# Patient Record
Sex: MALE | Race: WHITE | Employment: FULL TIME | URBAN - NONMETROPOLITAN AREA
[De-identification: names, ages, dates, MRNs, and addresses within clinical notes are randomized per-mention and may not be internally consistent; named-entity substitution may affect disease eponyms.]

---

## 2020-06-05 ENCOUNTER — APPOINTMENT (OUTPATIENT)
Dept: GENERAL RADIOLOGY | Age: 50
End: 2020-06-05
Payer: COMMERCIAL

## 2020-06-05 ENCOUNTER — ANESTHESIA (OUTPATIENT)
Dept: PERIOP | Facility: HOSPITAL | Age: 50
End: 2020-06-05

## 2020-06-05 ENCOUNTER — ANESTHESIA EVENT (OUTPATIENT)
Dept: PERIOP | Facility: HOSPITAL | Age: 50
End: 2020-06-05

## 2020-06-05 ENCOUNTER — APPOINTMENT (OUTPATIENT)
Dept: CT IMAGING | Age: 50
End: 2020-06-05
Payer: COMMERCIAL

## 2020-06-05 ENCOUNTER — HOSPITAL ENCOUNTER (EMERGENCY)
Age: 50
Discharge: OTHER INSTITUTION | End: 2020-06-05
Attending: EMERGENCY MEDICINE
Payer: COMMERCIAL

## 2020-06-05 ENCOUNTER — HOSPITAL ENCOUNTER (OUTPATIENT)
Facility: HOSPITAL | Age: 50
Discharge: HOME | End: 2020-06-07
Attending: INTERNAL MEDICINE | Admitting: OTOLARYNGOLOGY

## 2020-06-05 VITALS
TEMPERATURE: 98.9 F | WEIGHT: 185 LBS | HEIGHT: 67 IN | HEART RATE: 76 BPM | BODY MASS INDEX: 29.03 KG/M2 | SYSTOLIC BLOOD PRESSURE: 121 MMHG | OXYGEN SATURATION: 99 % | RESPIRATION RATE: 20 BRPM | DIASTOLIC BLOOD PRESSURE: 76 MMHG

## 2020-06-05 DIAGNOSIS — L03.211 CELLULITIS OF FACE: ICD-10-CM

## 2020-06-05 DIAGNOSIS — E11.65 UNCONTROLLED TYPE 2 DIABETES MELLITUS WITH HYPERGLYCEMIA (HCC): ICD-10-CM

## 2020-06-05 DIAGNOSIS — L02.01 FACIAL ABSCESS: Primary | ICD-10-CM

## 2020-06-05 PROBLEM — E87.1 HYPONATREMIA: Status: ACTIVE | Noted: 2020-06-05

## 2020-06-05 PROBLEM — R51.9 FACIAL PAIN, ACUTE: Status: ACTIVE | Noted: 2020-06-05

## 2020-06-05 LAB
ACETONE BLOOD: NEGATIVE
ALBUMIN SERPL-MCNC: 3.9 G/DL (ref 3.5–5.2)
ALP BLD-CCNC: 105 U/L (ref 40–130)
ALT SERPL-CCNC: 16 U/L (ref 5–41)
ANION GAP SERPL CALCULATED.3IONS-SCNC: 13 MMOL/L (ref 7–19)
AST SERPL-CCNC: 14 U/L (ref 5–40)
BASOPHILS ABSOLUTE: 0.1 K/UL (ref 0–0.2)
BASOPHILS RELATIVE PERCENT: 0.4 % (ref 0–1)
BILIRUB SERPL-MCNC: 0.7 MG/DL (ref 0.2–1.2)
BILIRUBIN URINE: NEGATIVE
BLOOD, URINE: NEGATIVE
BUN BLDV-MCNC: 18 MG/DL (ref 6–20)
C-REACTIVE PROTEIN: 19.23 MG/DL (ref 0–0.5)
CALCIUM SERPL-MCNC: 9 MG/DL (ref 8.6–10)
CHLORIDE BLD-SCNC: 96 MMOL/L (ref 98–111)
CLARITY: CLEAR
CO2: 24 MMOL/L (ref 22–29)
COLOR: YELLOW
CREAT SERPL-MCNC: 0.7 MG/DL (ref 0.5–1.2)
EOSINOPHILS ABSOLUTE: 0.1 K/UL (ref 0–0.6)
EOSINOPHILS RELATIVE PERCENT: 0.4 % (ref 0–5)
GFR NON-AFRICAN AMERICAN: >60
GLUCOSE BLD-MCNC: 269 MG/DL (ref 74–109)
GLUCOSE URINE: >=1000 MG/DL
HBA1C MFR BLD: 14.4 % (ref 4–6)
HCT VFR BLD CALC: 44.7 % (ref 42–52)
HEMOGLOBIN: 16.1 G/DL (ref 14–18)
IMMATURE GRANULOCYTES #: 0.1 K/UL
KETONES, URINE: 80 MG/DL
LACTIC ACID: 1.1 MMOL/L (ref 0.5–1.9)
LEUKOCYTE ESTERASE, URINE: NEGATIVE
LYMPHOCYTES ABSOLUTE: 1.8 K/UL (ref 1.1–4.5)
LYMPHOCYTES RELATIVE PERCENT: 14.7 % (ref 20–40)
MCH RBC QN AUTO: 29.9 PG (ref 27–31)
MCHC RBC AUTO-ENTMCNC: 36 G/DL (ref 33–37)
MCV RBC AUTO: 83.1 FL (ref 80–94)
MONOCYTES ABSOLUTE: 0.9 K/UL (ref 0–0.9)
MONOCYTES RELATIVE PERCENT: 7.4 % (ref 0–10)
MRSA DNA SPEC QL NAA+PROBE: NORMAL
NEUTROPHILS ABSOLUTE: 9.6 K/UL (ref 1.5–7.5)
NEUTROPHILS RELATIVE PERCENT: 76.6 % (ref 50–65)
NITRITE, URINE: NEGATIVE
PDW BLD-RTO: 11.9 % (ref 11.5–14.5)
PH UA: 5.5 (ref 5–8)
PLATELET # BLD: 200 K/UL (ref 130–400)
PMV BLD AUTO: 11.9 FL (ref 9.4–12.4)
POTASSIUM REFLEX MAGNESIUM: 4.2 MMOL/L (ref 3.5–5)
PROTEIN UA: ABNORMAL MG/DL
RBC # BLD: 5.38 M/UL (ref 4.7–6.1)
SARS-COV-2 RNA PNL SPEC NAA+PROBE: NOT DETECTED
SODIUM BLD-SCNC: 133 MMOL/L (ref 136–145)
SPECIFIC GRAVITY UA: >1.045 (ref 1–1.03)
TOTAL PROTEIN: 7.3 G/DL (ref 6.6–8.7)
UROBILINOGEN, URINE: 0.2 E.U./DL
WBC # BLD: 12.5 K/UL (ref 4.8–10.8)

## 2020-06-05 PROCEDURE — 85025 COMPLETE CBC W/AUTO DIFF WBC: CPT

## 2020-06-05 PROCEDURE — 40801 DRAINAGE OF MOUTH LESION: CPT | Performed by: OTOLARYNGOLOGY

## 2020-06-05 PROCEDURE — 25010000002 ONDANSETRON PER 1 MG: Performed by: NURSE ANESTHETIST, CERTIFIED REGISTERED

## 2020-06-05 PROCEDURE — 87070 CULTURE OTHR SPECIMN AEROBIC: CPT | Performed by: OTOLARYNGOLOGY

## 2020-06-05 PROCEDURE — 25010000002 KETOROLAC TROMETHAMINE PER 15 MG: Performed by: NURSE PRACTITIONER

## 2020-06-05 PROCEDURE — 81003 URINALYSIS AUTO W/O SCOPE: CPT

## 2020-06-05 PROCEDURE — 71045 X-RAY EXAM CHEST 1 VIEW: CPT

## 2020-06-05 PROCEDURE — 94799 UNLISTED PULMONARY SVC/PX: CPT

## 2020-06-05 PROCEDURE — 87186 SC STD MICRODIL/AGAR DIL: CPT | Performed by: OTOLARYNGOLOGY

## 2020-06-05 PROCEDURE — 96366 THER/PROPH/DIAG IV INF ADDON: CPT

## 2020-06-05 PROCEDURE — 25010000002 PROPOFOL 10 MG/ML EMULSION: Performed by: NURSE ANESTHETIST, CERTIFIED REGISTERED

## 2020-06-05 PROCEDURE — 96361 HYDRATE IV INFUSION ADD-ON: CPT

## 2020-06-05 PROCEDURE — 83605 ASSAY OF LACTIC ACID: CPT

## 2020-06-05 PROCEDURE — 96365 THER/PROPH/DIAG IV INF INIT: CPT

## 2020-06-05 PROCEDURE — 87635 SARS-COV-2 COVID-19 AMP PRB: CPT | Performed by: INTERNAL MEDICINE

## 2020-06-05 PROCEDURE — 25010000002 SUCCINYLCHOLINE PER 20 MG: Performed by: NURSE ANESTHETIST, CERTIFIED REGISTERED

## 2020-06-05 PROCEDURE — 6370000000 HC RX 637 (ALT 250 FOR IP): Performed by: EMERGENCY MEDICINE

## 2020-06-05 PROCEDURE — 87147 CULTURE TYPE IMMUNOLOGIC: CPT | Performed by: OTOLARYNGOLOGY

## 2020-06-05 PROCEDURE — 87641 MR-STAPH DNA AMP PROBE: CPT | Performed by: NURSE PRACTITIONER

## 2020-06-05 PROCEDURE — 82009 KETONE BODYS QUAL: CPT

## 2020-06-05 PROCEDURE — G0378 HOSPITAL OBSERVATION PER HR: HCPCS

## 2020-06-05 PROCEDURE — 96374 THER/PROPH/DIAG INJ IV PUSH: CPT

## 2020-06-05 PROCEDURE — 25010000002 HYDROMORPHONE PER 4 MG: Performed by: NURSE PRACTITIONER

## 2020-06-05 PROCEDURE — 99284 EMERGENCY DEPT VISIT MOD MDM: CPT

## 2020-06-05 PROCEDURE — 99204 OFFICE O/P NEW MOD 45 MIN: CPT | Performed by: OTOLARYNGOLOGY

## 2020-06-05 PROCEDURE — 96375 TX/PRO/DX INJ NEW DRUG ADDON: CPT

## 2020-06-05 PROCEDURE — 87075 CULTR BACTERIA EXCEPT BLOOD: CPT | Performed by: OTOLARYNGOLOGY

## 2020-06-05 PROCEDURE — 96376 TX/PRO/DX INJ SAME DRUG ADON: CPT

## 2020-06-05 PROCEDURE — G0379 DIRECT REFER HOSPITAL OBSERV: HCPCS

## 2020-06-05 PROCEDURE — 70491 CT SOFT TISSUE NECK W/DYE: CPT

## 2020-06-05 PROCEDURE — 36415 COLL VENOUS BLD VENIPUNCTURE: CPT

## 2020-06-05 PROCEDURE — 96367 TX/PROPH/DG ADDL SEQ IV INF: CPT

## 2020-06-05 PROCEDURE — 87040 BLOOD CULTURE FOR BACTERIA: CPT

## 2020-06-05 PROCEDURE — 2500000003 HC RX 250 WO HCPCS: Performed by: EMERGENCY MEDICINE

## 2020-06-05 PROCEDURE — 6360000002 HC RX W HCPCS

## 2020-06-05 PROCEDURE — 25010000002 KETOROLAC TROMETHAMINE PER 15 MG: Performed by: OTOLARYNGOLOGY

## 2020-06-05 PROCEDURE — 6360000004 HC RX CONTRAST MEDICATION: Performed by: EMERGENCY MEDICINE

## 2020-06-05 PROCEDURE — 86140 C-REACTIVE PROTEIN: CPT

## 2020-06-05 PROCEDURE — C9803 HOPD COVID-19 SPEC COLLECT: HCPCS

## 2020-06-05 PROCEDURE — 6360000002 HC RX W HCPCS: Performed by: EMERGENCY MEDICINE

## 2020-06-05 PROCEDURE — 83036 HEMOGLOBIN GLYCOSYLATED A1C: CPT

## 2020-06-05 PROCEDURE — 87205 SMEAR GRAM STAIN: CPT | Performed by: OTOLARYNGOLOGY

## 2020-06-05 PROCEDURE — 80053 COMPREHEN METABOLIC PANEL: CPT

## 2020-06-05 PROCEDURE — 2580000003 HC RX 258: Performed by: EMERGENCY MEDICINE

## 2020-06-05 RX ORDER — NALOXONE HCL 0.4 MG/ML
0.04 VIAL (ML) INJECTION AS NEEDED
Status: DISCONTINUED | OUTPATIENT
Start: 2020-06-05 | End: 2020-06-05 | Stop reason: HOSPADM

## 2020-06-05 RX ORDER — ACETAMINOPHEN 160 MG/5ML
650 SOLUTION ORAL EVERY 4 HOURS PRN
Status: DISCONTINUED | OUTPATIENT
Start: 2020-06-05 | End: 2020-06-07 | Stop reason: HOSPADM

## 2020-06-05 RX ORDER — SODIUM CHLORIDE, SODIUM LACTATE, POTASSIUM CHLORIDE, CALCIUM CHLORIDE 600; 310; 30; 20 MG/100ML; MG/100ML; MG/100ML; MG/100ML
100 INJECTION, SOLUTION INTRAVENOUS CONTINUOUS
Status: CANCELLED | OUTPATIENT
Start: 2020-06-05

## 2020-06-05 RX ORDER — LIDOCAINE HYDROCHLORIDE 10 MG/ML
0.5 INJECTION, SOLUTION EPIDURAL; INFILTRATION; INTRACAUDAL; PERINEURAL ONCE AS NEEDED
Status: CANCELLED | OUTPATIENT
Start: 2020-06-05

## 2020-06-05 RX ORDER — OXYCODONE AND ACETAMINOPHEN 10; 325 MG/1; MG/1
1 TABLET ORAL ONCE AS NEEDED
Status: DISCONTINUED | OUTPATIENT
Start: 2020-06-05 | End: 2020-06-05 | Stop reason: HOSPADM

## 2020-06-05 RX ORDER — LIDOCAINE HYDROCHLORIDE AND EPINEPHRINE 10; 10 MG/ML; UG/ML
INJECTION, SOLUTION INFILTRATION; PERINEURAL AS NEEDED
Status: DISCONTINUED | OUTPATIENT
Start: 2020-06-05 | End: 2020-06-05 | Stop reason: HOSPADM

## 2020-06-05 RX ORDER — ACETAMINOPHEN 650 MG/1
650 SUPPOSITORY RECTAL EVERY 4 HOURS PRN
Status: DISCONTINUED | OUTPATIENT
Start: 2020-06-05 | End: 2020-06-07 | Stop reason: HOSPADM

## 2020-06-05 RX ORDER — LIDOCAINE HYDROCHLORIDE 20 MG/ML
INJECTION, SOLUTION INFILTRATION; PERINEURAL AS NEEDED
Status: DISCONTINUED | OUTPATIENT
Start: 2020-06-05 | End: 2020-06-05 | Stop reason: SURG

## 2020-06-05 RX ORDER — OXYCODONE AND ACETAMINOPHEN 7.5; 325 MG/1; MG/1
2 TABLET ORAL ONCE AS NEEDED
Status: COMPLETED | OUTPATIENT
Start: 2020-06-05 | End: 2020-06-05

## 2020-06-05 RX ORDER — MIDAZOLAM HYDROCHLORIDE 1 MG/ML
1 INJECTION INTRAMUSCULAR; INTRAVENOUS
Status: CANCELLED | OUTPATIENT
Start: 2020-06-05

## 2020-06-05 RX ORDER — FENTANYL CITRATE 50 UG/ML
25 INJECTION, SOLUTION INTRAMUSCULAR; INTRAVENOUS
Status: DISCONTINUED | OUTPATIENT
Start: 2020-06-05 | End: 2020-06-05 | Stop reason: HOSPADM

## 2020-06-05 RX ORDER — HYDROMORPHONE HYDROCHLORIDE 1 MG/ML
0.5 INJECTION, SOLUTION INTRAMUSCULAR; INTRAVENOUS; SUBCUTANEOUS EVERY 30 MIN PRN
Status: COMPLETED | OUTPATIENT
Start: 2020-06-05 | End: 2020-06-05

## 2020-06-05 RX ORDER — CLINDAMYCIN PHOSPHATE 600 MG/50ML
600 INJECTION INTRAVENOUS EVERY 8 HOURS
Status: DISCONTINUED | OUTPATIENT
Start: 2020-06-05 | End: 2020-06-05

## 2020-06-05 RX ORDER — DEXTROSE MONOHYDRATE 25 G/50ML
25 INJECTION, SOLUTION INTRAVENOUS
Status: DISCONTINUED | OUTPATIENT
Start: 2020-06-05 | End: 2020-06-07 | Stop reason: HOSPADM

## 2020-06-05 RX ORDER — ONDANSETRON 2 MG/ML
4 INJECTION INTRAMUSCULAR; INTRAVENOUS ONCE
Status: COMPLETED | OUTPATIENT
Start: 2020-06-05 | End: 2020-06-05

## 2020-06-05 RX ORDER — SODIUM CHLORIDE 0.9 % (FLUSH) 0.9 %
3 SYRINGE (ML) INJECTION EVERY 12 HOURS SCHEDULED
Status: CANCELLED | OUTPATIENT
Start: 2020-06-05

## 2020-06-05 RX ORDER — SODIUM CHLORIDE 9 MG/ML
50 INJECTION, SOLUTION INTRAVENOUS CONTINUOUS
Status: DISCONTINUED | OUTPATIENT
Start: 2020-06-05 | End: 2020-06-06

## 2020-06-05 RX ORDER — LABETALOL HYDROCHLORIDE 5 MG/ML
5 INJECTION, SOLUTION INTRAVENOUS
Status: DISCONTINUED | OUTPATIENT
Start: 2020-06-05 | End: 2020-06-05 | Stop reason: HOSPADM

## 2020-06-05 RX ORDER — SUCCINYLCHOLINE CHLORIDE 20 MG/ML
INJECTION INTRAMUSCULAR; INTRAVENOUS AS NEEDED
Status: DISCONTINUED | OUTPATIENT
Start: 2020-06-05 | End: 2020-06-05 | Stop reason: SURG

## 2020-06-05 RX ORDER — 0.9 % SODIUM CHLORIDE 0.9 %
1000 INTRAVENOUS SOLUTION INTRAVENOUS ONCE
Status: COMPLETED | OUTPATIENT
Start: 2020-06-05 | End: 2020-06-05

## 2020-06-05 RX ORDER — MIDAZOLAM HYDROCHLORIDE 1 MG/ML
2 INJECTION INTRAMUSCULAR; INTRAVENOUS
Status: CANCELLED | OUTPATIENT
Start: 2020-06-05

## 2020-06-05 RX ORDER — NICOTINE POLACRILEX 4 MG
15 LOZENGE BUCCAL
Status: DISCONTINUED | OUTPATIENT
Start: 2020-06-05 | End: 2020-06-07 | Stop reason: HOSPADM

## 2020-06-05 RX ORDER — ACETAMINOPHEN 500 MG
1000 TABLET ORAL ONCE
Status: CANCELLED | OUTPATIENT
Start: 2020-06-05 | End: 2020-06-05

## 2020-06-05 RX ORDER — ACETAMINOPHEN 325 MG/1
650 TABLET ORAL EVERY 4 HOURS PRN
Status: DISCONTINUED | OUTPATIENT
Start: 2020-06-05 | End: 2020-06-07 | Stop reason: HOSPADM

## 2020-06-05 RX ORDER — SODIUM CHLORIDE 0.9 % (FLUSH) 0.9 %
10 SYRINGE (ML) INJECTION EVERY 12 HOURS SCHEDULED
Status: DISCONTINUED | OUTPATIENT
Start: 2020-06-05 | End: 2020-06-07 | Stop reason: HOSPADM

## 2020-06-05 RX ORDER — IBUPROFEN 200 MG
200 TABLET ORAL EVERY 6 HOURS PRN
COMMUNITY

## 2020-06-05 RX ORDER — CLINDAMYCIN PHOSPHATE 600 MG/50ML
600 INJECTION INTRAVENOUS ONCE
Status: COMPLETED | OUTPATIENT
Start: 2020-06-05 | End: 2020-06-05

## 2020-06-05 RX ORDER — SODIUM CHLORIDE 0.9 % (FLUSH) 0.9 %
3-10 SYRINGE (ML) INJECTION AS NEEDED
Status: CANCELLED | OUTPATIENT
Start: 2020-06-05

## 2020-06-05 RX ORDER — PROPOFOL 10 MG/ML
VIAL (ML) INTRAVENOUS AS NEEDED
Status: DISCONTINUED | OUTPATIENT
Start: 2020-06-05 | End: 2020-06-05 | Stop reason: SURG

## 2020-06-05 RX ORDER — ONDANSETRON 2 MG/ML
4 INJECTION INTRAMUSCULAR; INTRAVENOUS AS NEEDED
Status: DISCONTINUED | OUTPATIENT
Start: 2020-06-05 | End: 2020-06-05 | Stop reason: HOSPADM

## 2020-06-05 RX ORDER — HYDROMORPHONE HYDROCHLORIDE 1 MG/ML
0.5 INJECTION, SOLUTION INTRAMUSCULAR; INTRAVENOUS; SUBCUTANEOUS
Status: DISCONTINUED | OUTPATIENT
Start: 2020-06-05 | End: 2020-06-07 | Stop reason: HOSPADM

## 2020-06-05 RX ORDER — KETOROLAC TROMETHAMINE 30 MG/ML
30 INJECTION, SOLUTION INTRAMUSCULAR; INTRAVENOUS EVERY 6 HOURS PRN
Status: DISCONTINUED | OUTPATIENT
Start: 2020-06-05 | End: 2020-06-07 | Stop reason: HOSPADM

## 2020-06-05 RX ORDER — ONDANSETRON 2 MG/ML
INJECTION INTRAMUSCULAR; INTRAVENOUS AS NEEDED
Status: DISCONTINUED | OUTPATIENT
Start: 2020-06-05 | End: 2020-06-05 | Stop reason: SURG

## 2020-06-05 RX ORDER — SODIUM CHLORIDE 0.9 % (FLUSH) 0.9 %
10 SYRINGE (ML) INJECTION AS NEEDED
Status: DISCONTINUED | OUTPATIENT
Start: 2020-06-05 | End: 2020-06-07 | Stop reason: HOSPADM

## 2020-06-05 RX ORDER — HYDROMORPHONE HYDROCHLORIDE 1 MG/ML
0.5 INJECTION, SOLUTION INTRAMUSCULAR; INTRAVENOUS; SUBCUTANEOUS ONCE
Status: COMPLETED | OUTPATIENT
Start: 2020-06-05 | End: 2020-06-05

## 2020-06-05 RX ORDER — FLUMAZENIL 0.1 MG/ML
0.2 INJECTION INTRAVENOUS AS NEEDED
Status: DISCONTINUED | OUTPATIENT
Start: 2020-06-05 | End: 2020-06-05 | Stop reason: HOSPADM

## 2020-06-05 RX ORDER — MORPHINE SULFATE 2 MG/ML
2 INJECTION, SOLUTION INTRAMUSCULAR; INTRAVENOUS
Status: DISCONTINUED | OUTPATIENT
Start: 2020-06-05 | End: 2020-06-05 | Stop reason: HOSPADM

## 2020-06-05 RX ADMIN — PROPOFOL 50 MG: 10 INJECTION, EMULSION INTRAVENOUS at 22:00

## 2020-06-05 RX ADMIN — KETOROLAC TROMETHAMINE 30 MG: 30 INJECTION, SOLUTION INTRAMUSCULAR; INTRAVENOUS at 22:02

## 2020-06-05 RX ADMIN — SUCCINYLCHOLINE CHLORIDE 140 MG: 20 INJECTION, SOLUTION INTRAMUSCULAR; INTRAVENOUS at 21:52

## 2020-06-05 RX ADMIN — OXYCODONE HYDROCHLORIDE AND ACETAMINOPHEN 2 TABLET: 7.5; 325 TABLET ORAL at 22:39

## 2020-06-05 RX ADMIN — ONDANSETRON 4 MG: 2 INJECTION INTRAMUSCULAR; INTRAVENOUS at 12:39

## 2020-06-05 RX ADMIN — HYDROMORPHONE HYDROCHLORIDE 0.5 MG: 1 INJECTION, SOLUTION INTRAMUSCULAR; INTRAVENOUS; SUBCUTANEOUS at 17:08

## 2020-06-05 RX ADMIN — ONDANSETRON HYDROCHLORIDE 4 MG: 2 SOLUTION INTRAMUSCULAR; INTRAVENOUS at 22:39

## 2020-06-05 RX ADMIN — HYDROMORPHONE HYDROCHLORIDE 0.5 MG: 1 INJECTION, SOLUTION INTRAMUSCULAR; INTRAVENOUS; SUBCUTANEOUS at 12:39

## 2020-06-05 RX ADMIN — INSULIN LISPRO 6 UNITS: 100 INJECTION, SOLUTION INTRAVENOUS; SUBCUTANEOUS at 17:07

## 2020-06-05 RX ADMIN — PROPOFOL 50 MG: 10 INJECTION, EMULSION INTRAVENOUS at 22:04

## 2020-06-05 RX ADMIN — IOPAMIDOL 90 ML: 755 INJECTION, SOLUTION INTRAVENOUS at 13:35

## 2020-06-05 RX ADMIN — Medication 1000 MG: at 13:20

## 2020-06-05 RX ADMIN — CLINDAMYCIN PHOSPHATE 600 MG: 600 INJECTION, SOLUTION INTRAVENOUS at 12:39

## 2020-06-05 RX ADMIN — HYDROMORPHONE HYDROCHLORIDE 0.5 MG: 1 INJECTION, SOLUTION INTRAMUSCULAR; INTRAVENOUS; SUBCUTANEOUS at 14:26

## 2020-06-05 RX ADMIN — SODIUM CHLORIDE 100 ML/HR: 9 INJECTION, SOLUTION INTRAVENOUS at 19:51

## 2020-06-05 RX ADMIN — LIDOCAINE HYDROCHLORIDE 100 MG: 20 INJECTION, SOLUTION INFILTRATION; PERINEURAL at 21:52

## 2020-06-05 RX ADMIN — SODIUM CHLORIDE 1000 ML: 9 INJECTION, SOLUTION INTRAVENOUS at 12:39

## 2020-06-05 RX ADMIN — HYDROMORPHONE HYDROCHLORIDE 0.5 MG: 1 INJECTION, SOLUTION INTRAMUSCULAR; INTRAVENOUS; SUBCUTANEOUS at 19:51

## 2020-06-05 RX ADMIN — ONDANSETRON HYDROCHLORIDE 4 MG: 2 SOLUTION INTRAMUSCULAR; INTRAVENOUS at 22:02

## 2020-06-05 RX ADMIN — PROPOFOL 200 MG: 10 INJECTION, EMULSION INTRAVENOUS at 21:52

## 2020-06-05 ASSESSMENT — ENCOUNTER SYMPTOMS
SHORTNESS OF BREATH: 0
EYE DISCHARGE: 0
NAUSEA: 0
SORE THROAT: 0
BLOOD IN STOOL: 0
SINUS PRESSURE: 0
TROUBLE SWALLOWING: 0
CONSTIPATION: 0
FACIAL SWELLING: 1
APNEA: 0
CHOKING: 0
VOICE CHANGE: 0
DIARRHEA: 0

## 2020-06-05 ASSESSMENT — PAIN SCALES - GENERAL
PAINLEVEL_OUTOF10: 6
PAINLEVEL_OUTOF10: 10
PAINLEVEL_OUTOF10: 5

## 2020-06-05 NOTE — ED NOTES
ASSESSMENT:  Pt presents with swelling to upper lip and nose area. SKIN:  Warm, dry, pink. Cap refill < 2 sec  CARDIAC:  S1 S2 noted  LUNGS: clear upper and lower lobes. Respirations even and unlabored. ABDOMEN: bowel sounds noted upper and lower quadrants. Soft and tender. EXTREMITIES: bilateral DP and PT. No edema noted. Pt alert and oriented x4. Pupils equal and reactive. No distress noted. Side rails up and call light within reach.        Dariela Flores RN  06/05/20 0685

## 2020-06-05 NOTE — ED PROVIDER NOTES
the emergency physician with the below findings:    I have reviewed the results. Interpretation per the Radiologist below, if available at the time of this note:    CT Soft Tissue Neck W Contrast   Final Result   1. Upper lip cellulitis with midline (and slightly left of midline)   multilocular abscess. Signed by Dr Marisela Rubio on 6/5/2020 2:05 PM      XR CHEST PORTABLE   Final Result   Portable chest x-ray demonstrates no active disease. Signed by Dr Shane Yanes on 6/5/2020 11:55 AM            ED BEDSIDE ULTRASOUND:   Performed by ED Physician - none    LABS:  Labs Reviewed   CBC WITH AUTO DIFFERENTIAL - Abnormal; Notable for the following components:       Result Value    WBC 12.5 (*)     Neutrophils % 76.6 (*)     Lymphocytes % 14.7 (*)     Neutrophils Absolute 9.6 (*)     All other components within normal limits   COMPREHENSIVE METABOLIC PANEL W/ REFLEX TO MG FOR LOW K - Abnormal; Notable for the following components:    Sodium 133 (*)     Chloride 96 (*)     Glucose 269 (*)     All other components within normal limits   C-REACTIVE PROTEIN - Abnormal; Notable for the following components:    CRP 19.23 (*)     All other components within normal limits   URINE RT REFLEX TO CULTURE - Abnormal; Notable for the following components:    Glucose, Ur >=1000 (*)     Ketones, Urine 80 (*)     Protein, UA TRACE (*)     All other components within normal limits   HEMOGLOBIN A1C - Abnormal; Notable for the following components:    Hemoglobin A1C 14.4 (*)     All other components within normal limits   CULTURE, BLOOD 1   CULTURE, BLOOD 2   LACTIC ACID, PLASMA   ACETONE       All other labs were within normal range or not returned as of this dictation.     EMERGENCY DEPARTMENT COURSE and DIFFERENTIALDIAGNOSIS/MDM:   Vitals:    Vitals:    06/05/20 0955 06/05/20 1436 06/05/20 1554 06/05/20 1702   BP: (!) 146/93 130/76 128/76 120/76   Pulse: 93 75 74 75   Resp: 16 20 20 20   Temp: 98.3 °F (36.8 °C) TempSrc: Oral      SpO2: 96% 90% 97% 97%   Weight: 185 lb (83.9 kg)      Height: 5' 7\" (1.702 m)          MDM  Number of Diagnoses or Management Options  Diagnosis management comments: Patient has abscess that is drainable. But I do not have ENT or oral surgery available. I discussed the case with Rochelle Matute of ENT at [facility]. And with Dr. Zuri Rosado the hospitalist at [facility]. Patient be transferred to [facility] for definitive care. Patient remained stable in the ED. CONSULTS:  None    PROCEDURES:  Unless otherwise notedbelow, none     Procedures    FINAL IMPRESSION     1. Facial cellulitis    2. Abscess, lip    3.  Controlled type 2 diabetes mellitus with complication, without long-term current use of insulin [facility]          DISPOSITION/PLAN   DISPOSITION Decision To Transfer 06/05/2020 05:06:07 PM      PATIENT REFERRED TO:  @FUP@    DISCHARGE MEDICATIONS:  New Prescriptions    No medications on file          (Please note that portions of this note were completed with a voice recognition program.  Efforts were made to edit the dictations butoccasionally words are mis-transcribed.)    Sandra Kingsley MD (electronically signed)  AttendingEmergency Physician          Landon Cummings MD  06/05/20 0709
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
## 2020-06-06 LAB
BASOPHILS # BLD AUTO: 0.03 10*3/MM3 (ref 0–0.2)
BASOPHILS NFR BLD AUTO: 0.2 % (ref 0–1.5)
CHOLEST SERPL-MCNC: 155 MG/DL (ref 0–200)
DEPRECATED RDW RBC AUTO: 35.5 FL (ref 37–54)
EOSINOPHIL # BLD AUTO: 0.03 10*3/MM3 (ref 0–0.4)
EOSINOPHIL NFR BLD AUTO: 0.2 % (ref 0.3–6.2)
ERYTHROCYTE [DISTWIDTH] IN BLOOD BY AUTOMATED COUNT: 11.8 % (ref 12.3–15.4)
GLUCOSE BLDC GLUCOMTR-MCNC: 205 MG/DL (ref 70–130)
GLUCOSE BLDC GLUCOMTR-MCNC: 218 MG/DL (ref 70–130)
GLUCOSE BLDC GLUCOMTR-MCNC: 304 MG/DL (ref 70–130)
HCT VFR BLD AUTO: 38.6 % (ref 37.5–51)
HDLC SERPL-MCNC: 47 MG/DL (ref 40–60)
HGB BLD-MCNC: 13.5 G/DL (ref 13–17.7)
IMM GRANULOCYTES # BLD AUTO: 0.06 10*3/MM3 (ref 0–0.05)
IMM GRANULOCYTES NFR BLD AUTO: 0.5 % (ref 0–0.5)
LDLC SERPL CALC-MCNC: 86 MG/DL (ref 0–100)
LDLC/HDLC SERPL: 1.83 {RATIO}
LYMPHOCYTES # BLD AUTO: 1.89 10*3/MM3 (ref 0.7–3.1)
LYMPHOCYTES NFR BLD AUTO: 15 % (ref 19.6–45.3)
MCH RBC QN AUTO: 29.2 PG (ref 26.6–33)
MCHC RBC AUTO-ENTMCNC: 35 G/DL (ref 31.5–35.7)
MCV RBC AUTO: 83.4 FL (ref 79–97)
MONOCYTES # BLD AUTO: 1.11 10*3/MM3 (ref 0.1–0.9)
MONOCYTES NFR BLD AUTO: 8.8 % (ref 5–12)
NEUTROPHILS # BLD AUTO: 9.47 10*3/MM3 (ref 1.7–7)
NEUTROPHILS NFR BLD AUTO: 75.3 % (ref 42.7–76)
NRBC BLD AUTO-RTO: 0 /100 WBC (ref 0–0.2)
PLATELET # BLD AUTO: 197 10*3/MM3 (ref 140–450)
PMV BLD AUTO: 12.2 FL (ref 6–12)
RBC # BLD AUTO: 4.63 10*6/MM3 (ref 4.14–5.8)
TRIGL SERPL-MCNC: 110 MG/DL (ref 0–150)
VLDLC SERPL-MCNC: 22 MG/DL
WBC NRBC COR # BLD: 12.59 10*3/MM3 (ref 3.4–10.8)

## 2020-06-06 PROCEDURE — 63710000001 INSULIN LISPRO (HUMAN) PER 5 UNITS: Performed by: OTOLARYNGOLOGY

## 2020-06-06 PROCEDURE — 99024 POSTOP FOLLOW-UP VISIT: CPT | Performed by: OTOLARYNGOLOGY

## 2020-06-06 PROCEDURE — G0378 HOSPITAL OBSERVATION PER HR: HCPCS

## 2020-06-06 PROCEDURE — 25010000002 VANCOMYCIN 10 G RECONSTITUTED SOLUTION: Performed by: OTOLARYNGOLOGY

## 2020-06-06 PROCEDURE — 25010000002 HYDROMORPHONE PER 4 MG: Performed by: OTOLARYNGOLOGY

## 2020-06-06 PROCEDURE — 82962 GLUCOSE BLOOD TEST: CPT

## 2020-06-06 PROCEDURE — 25010000002 PIPERACILLIN SOD-TAZOBACTAM PER 1 G: Performed by: OTOLARYNGOLOGY

## 2020-06-06 PROCEDURE — 80061 LIPID PANEL: CPT | Performed by: OTOLARYNGOLOGY

## 2020-06-06 PROCEDURE — 85025 COMPLETE CBC W/AUTO DIFF WBC: CPT | Performed by: OTOLARYNGOLOGY

## 2020-06-06 PROCEDURE — 25010000002 KETOROLAC TROMETHAMINE PER 15 MG: Performed by: OTOLARYNGOLOGY

## 2020-06-06 RX ORDER — ECHINACEA PURPUREA EXTRACT 125 MG
2 TABLET ORAL CONTINUOUS PRN
Status: DISCONTINUED | OUTPATIENT
Start: 2020-06-06 | End: 2020-06-07 | Stop reason: HOSPADM

## 2020-06-06 RX ORDER — OXYMETAZOLINE HYDROCHLORIDE 0.05 G/100ML
2 SPRAY NASAL 3 TIMES DAILY
Status: DISCONTINUED | OUTPATIENT
Start: 2020-06-06 | End: 2020-06-07 | Stop reason: HOSPADM

## 2020-06-06 RX ORDER — ECHINACEA PURPUREA EXTRACT 125 MG
2 TABLET ORAL
Status: DISCONTINUED | OUTPATIENT
Start: 2020-06-06 | End: 2020-06-07 | Stop reason: HOSPADM

## 2020-06-06 RX ADMIN — SODIUM CHLORIDE, PRESERVATIVE FREE 10 ML: 5 INJECTION INTRAVENOUS at 08:03

## 2020-06-06 RX ADMIN — SALINE NASAL SPRAY 2 SPRAY: 1.5 SOLUTION NASAL at 18:01

## 2020-06-06 RX ADMIN — HYDROMORPHONE HYDROCHLORIDE 0.5 MG: 1 INJECTION, SOLUTION INTRAMUSCULAR; INTRAVENOUS; SUBCUTANEOUS at 03:53

## 2020-06-06 RX ADMIN — Medication 2 SPRAY: at 21:54

## 2020-06-06 RX ADMIN — INSULIN LISPRO 5 UNITS: 100 INJECTION, SOLUTION INTRAVENOUS; SUBCUTANEOUS at 18:00

## 2020-06-06 RX ADMIN — DOXYCYCLINE 100 MG: 100 INJECTION, POWDER, LYOPHILIZED, FOR SOLUTION INTRAVENOUS at 11:53

## 2020-06-06 RX ADMIN — KETOROLAC TROMETHAMINE 30 MG: 30 INJECTION, SOLUTION INTRAMUSCULAR; INTRAVENOUS at 08:04

## 2020-06-06 RX ADMIN — SALINE NASAL SPRAY 2 SPRAY: 1.5 SOLUTION NASAL at 21:55

## 2020-06-06 RX ADMIN — DOXYCYCLINE 100 MG: 100 INJECTION, POWDER, LYOPHILIZED, FOR SOLUTION INTRAVENOUS at 21:54

## 2020-06-06 RX ADMIN — Medication 2 SPRAY: at 15:11

## 2020-06-06 RX ADMIN — SODIUM CHLORIDE, PRESERVATIVE FREE 10 ML: 5 INJECTION INTRAVENOUS at 21:55

## 2020-06-06 RX ADMIN — Medication 2 SPRAY: at 11:54

## 2020-06-06 RX ADMIN — SODIUM CHLORIDE 100 ML/HR: 9 INJECTION, SOLUTION INTRAVENOUS at 00:39

## 2020-06-06 RX ADMIN — INSULIN LISPRO 3 UNITS: 100 INJECTION, SOLUTION INTRAVENOUS; SUBCUTANEOUS at 11:53

## 2020-06-06 RX ADMIN — TAZOBACTAM SODIUM AND PIPERACILLIN SODIUM 3.38 G: 375; 3 INJECTION, SOLUTION INTRAVENOUS at 03:53

## 2020-06-06 RX ADMIN — SODIUM CHLORIDE 100 ML/HR: 9 INJECTION, SOLUTION INTRAVENOUS at 11:37

## 2020-06-06 RX ADMIN — SALINE NASAL SPRAY 2 SPRAY: 1.5 SOLUTION NASAL at 11:54

## 2020-06-06 RX ADMIN — HYDROMORPHONE HYDROCHLORIDE 0.5 MG: 1 INJECTION, SOLUTION INTRAMUSCULAR; INTRAVENOUS; SUBCUTANEOUS at 00:39

## 2020-06-06 RX ADMIN — SALINE NASAL SPRAY 2 SPRAY: 1.5 SOLUTION NASAL at 15:11

## 2020-06-06 RX ADMIN — VANCOMYCIN HYDROCHLORIDE 1500 MG: 10 INJECTION, POWDER, LYOPHILIZED, FOR SOLUTION INTRAVENOUS at 00:39

## 2020-06-06 NOTE — PROGRESS NOTES
Phillip Parra Jr, MD     OTOLARYNGOLOGY, HEAD AND NECK SURGERY PROGRESS NOTE   Referring Provider: Laci Teran MD  Reason for Consultation: Midface abscess  Location: 394/1  Accompanied by: No one  Chief complaint: No chief complaint on file.        Interval History:   Since last examined, Nima De Leon has undergone surgery. He is improved. His lip is much better  Patient seen. Chart reviewed.      Review of Systems:   No change from prior inquiry    Vital Signs  Temp:  [98.4 °F (36.9 °C)-100.4 °F (38 °C)] 99.2 °F (37.3 °C)  Heart Rate:  [] 83  Resp:  [10-18] 16  BP: ()/(54-79) 115/61        EXAMINATION:  CONSTITUTIONAL:    well nourished, well-developed, alert, oriented, in no acute distress   Lyng n bed     BODY HABITUS:    Normal body habitus     COMMUNICATION:    Improved     HEAD:     Normocephalic, without obvious abnormality, atraumatic     FACE:    Upper lip and columella with decreased edema, erythema and mass effect     SALIVARY GLANDS:    parotid glands with no tenderness, no swelling, no masses, submandibular glands with normal size, nontender      EYE:      PERRL    EOM- normal  Bilateral     HEARING:    response to conversational voice normal     EARS:    Otoscopic exam    PINNA:     normal, non-tender, skin intact without lesions      NOSE EXTERNAL:    APPEARANCE: mild to mod edema of columella into upper lip, Packing present  Removed packing   NOSE INTERNAL:    Anterior rhinoscopy   NASALMUCOSA:    abnormal:        Bilateral- edema in columella     Pain prevents further assessment     ORAL CAVITY:      LIPS:      upper lip- much less edeam, internal surface closed with suture      lower lip- normal     TEETH:       edentulous:  upper      missing teeth:  lower      Repair: fair    GUMS:      intact but upper anterior sulcus tender    ORAL MUCOSA:       abnormal: inner upper lip edema    FLOOR OF MOUTH:       Warthin's duct patent, mucosa normal  TONGUE:       lingual mucosa  normal without lesions, normal tongue mobility    PALATE:       hard palate with normal mucosa and structure      soft palate with normal mucosa and structure, normal mobility uvula intact     OROPHARYNX:    Direct examination  oropharyngeal mucosa normal, tonsil(s) with normal appearance       NECK:    normal appearance, no masses, no lesions, larynx normal mobility, trachea midline     LYMPH NODES :    no adenopathy     THYROID:    no overt thyromegaly, no tenderness, nodules or mass present on palpation, position midline     CHEST/RESPIRATORY:    respiratory effort normal, no rales, rubs or wheezing, no stridor, normal appearance to chest     CARDIOVASCULAR:    regular rate and rhythm, no murmurs, gallups, no peripheral edema     NEURO/PSYCHIATRIC :    oriented appropriately for age, mood normal, affect appropriate, cranial nerves intact grossly (unless specifically described), gait normal for age     Results Review:    Lab Results (last 24 hours)     Procedure Component Value Units Date/Time    POC Glucose Once [[id number]]  (Abnormal) Collected:  06/06/20 0758    Specimen:  Blood Updated:  06/06/20 0810     Glucose 218 mg/dL      Comment: : [id number] [place] [address] BillinaMeter ID: [id number]       Wound Culture - Wound, Nose [[id number]] Collected:  06/05/20 2213    Specimen:  Wound from Nose Updated:  06/06/20 0642     Gram Stain Rare (1+) WBCs seen      Rare (1+) Gram positive cocci    Body Fluid Culture - Body Fluid, Lip, Upper [[id number]] Collected:  06/05/20 2209    Specimen:  Body Fluid from Lip, Upper Updated:  06/06/20 0637     Body Fluid Culture Abnormal Stain     Gram Stain Many (4+) WBCs seen      Moderate (3+) Gram positive cocci    Lipid Panel [[id number]] Collected:  06/06/20 0311    Specimen:  Blood Updated:  06/06/20 0436     Total Cholesterol 155 mg/dL      Triglycerides 110 mg/dL      HDL Cholesterol 47 mg/dL      LDL Cholesterol  86 mg/dL      VLDL Cholesterol 22 mg/dL      LDL/HDL Ratio 1.83       Narrative:       Cholesterol Reference Ranges  (U.S. Department of Health and Human Services ATP III Classifications)    Desirable          <200 mg/dL  Borderline High    200-239 mg/dL  High Risk          >240 mg/dL      Triglyceride Reference Ranges  (U.S. Department of Health and Human Services ATP III Classifications)    Normal           <150 mg/dL  Borderline High  150-199 mg/dL  High             200-499 mg/dL  Very High        >500 mg/dL    HDL Reference Ranges  (U.S. Department of Health and Human Services ATP III Classifcations)    Low     <40 mg/dl (major risk factor for CHD)  High    >60 mg/dl ('negative' risk factor for CHD)        LDL Reference Ranges  (U.S. Department of Health and Human Services ATP III Classifcations)    Optimal          <100 mg/dL  Near Optimal     100-129 mg/dL  Borderline High  130-159 mg/dL  High             160-189 mg/dL  Very High        >189 mg/dL    CBC Auto Differential [[id number]]  (Abnormal) Collected:  06/06/20 0311    Specimen:  Blood Updated:  06/06/20 0419     WBC 12.59 10*3/mm3      RBC 4.63 10*6/mm3      Hemoglobin 13.5 g/dL      Hematocrit 38.6 %      MCV 83.4 fL      MCH 29.2 pg      MCHC 35.0 g/dL      RDW 11.8 %      RDW-SD 35.5 fl      MPV 12.2 fL      Platelets 197 10*3/mm3      Neutrophil % 75.3 %      Lymphocyte % 15.0 %      Monocyte % 8.8 %      Eosinophil % 0.2 %      Basophil % 0.2 %      Immature Grans % 0.5 %      Neutrophils, Absolute 9.47 10*3/mm3      Lymphocytes, Absolute 1.89 10*3/mm3      Monocytes, Absolute 1.11 10*3/mm3      Eosinophils, Absolute 0.03 10*3/mm3      Basophils, Absolute 0.03 10*3/mm3      Immature Grans, Absolute 0.06 10*3/mm3      nRBC 0.0 /100 WBC     MRSA Screen, PCR (Inpatient) - Swab, Nares [[id number]]  (Normal) Collected:  06/05/20 2055    Specimen:  Swab from Nares Updated:  06/05/20 2240     MRSA PCR No MRSA Detected    Anaerobic Culture - Body Fluid, Lip, Upper [[id number]] Collected:  06/05/20 2209    Specimen:  Body  Fluid from Lip, Upper Updated:  06/05/20 2228    COVID-19, [facility]OTT IN-HOUSE,NP Swab (NO TRANSPORT MEDIA) 2 HR TAT - Swab, Nasopharynx [[id number]]  (Normal) Collected:  06/05/20 2055    Specimen:  Swab from Nasopharynx Updated:  06/05/20 2136     COVID19 Not Detected        Imaging Results (Last 24 Hours)     ** No results found for the last 24 hours. **          Medications, Allergies and Past History Reviewed      Assessment       Facial abscess    Cellulitis of face    Uncontrolled type 2 diabetes mellitus with hyperglycemia (CMS/[facility])    Facial pain, acute    Hyponatremia         Plan      Patient has markedly improved exam. Packing is remoeved. Discussed findings of surgery with patient.  Recommend-  Advance diet  Heat to lip  Oral/Nasal care  Shower  antibiotics for 24 more hrs    Following patient as in-patient. Further recommendations will be made based on serial examinations.    Medications/Orders for this encounter: Medications ordered- Afrin, oral care   Orders-  Shower, clean wound       Phillip Parra Jr, MD  06/06/20  09:58

## 2020-06-06 NOTE — PLAN OF CARE
Problem: Patient Care Overview  Goal: Plan of Care Review  Outcome: Ongoing (interventions implemented as appropriate)  Flowsheets (Taken 6/6/2020 1224)  Progress: no change  Plan of Care Reviewed With: patient  Outcome Summary: Patient admitted to  under the care of the Hospitalist for c/o facial pain and swelling r/t facial abcess. Dr. Parra consulted. Aida performed an incision and drainage of the left upper lip. Mustache dressing in use. C/o pain. Pain medication given per PRN order. NPO. IVF anf IV abx. VSS. Safety maintained. Will continue to monitor.

## 2020-06-06 NOTE — ANESTHESIA PROCEDURE NOTES
Airway  Urgency: elective    Date/Time: 6/5/2020 9:53 PM  Airway not difficult    General Information and Staff    Patient location during procedure: OR  CRNA: GLORY Rudd CRNA    Indications and Patient Condition  Indications for airway management: airway protection    Preoxygenated: yes  MILS maintained throughout  Mask difficulty assessment: 0 - not attempted    Final Airway Details  Final airway type: endotracheal airway      Successful airway: ETT  Cuffed: yes   Successful intubation technique: direct laryngoscopy and video laryngoscopy  Facilitating devices/methods: intubating stylet  Endotracheal tube insertion site: oral  Blade: Perry  Blade size: 4  ETT size (mm): 7.0  Cormack-Lehane Classification: grade I - full view of glottis  Placement verified by: chest auscultation and capnometry   Cuff volume (mL): 8  Measured from: lips  ETT/EBT  to lips (cm): 21  Number of attempts at approach: 1  Assessment: lips, teeth, and gum same as pre-op and atraumatic intubation

## 2020-06-06 NOTE — PROGRESS NOTES
"Pharmacy Dosing Service  Pharmacokinetics  Vancomycin Initial Evaluation    Assessment/Action/Plan:  Initiated Vancomycin 1000 mg IVPB once, followed by 1500 mg every 12 hours. Vancomycin trough ordered prior to 4th dose on 06/07/20 before 0000 dose..  Current vancomycin end date: 06/12. Pharmacy will monitor renal function and adjust dose accordingly.     Subjective:  Nima De Leon is a 49 y.o. male with a Vancomycin \"Pharmacy to Dose\" consult for the treatment of Skin and Soft Tissue Infection .    Objective:  Ht:  ; Wt:    CrCl cannot be calculated (Unknown ideal weight.).   Lab Results   Component Value Date    CREATININE 0.7 06/05/2020      Lab Results   Component Value Date    WBC 12.5 (H) 06/05/2020      Baseline culture results:  Microbiology Results (last 10 days)       ** No results found for the last 240 hours. **            Julia Baker, PharmD  06/05/20 19:47    "

## 2020-06-06 NOTE — ANESTHESIA PREPROCEDURE EVALUATION
Anesthesia Evaluation     NPO Solid Status: > 8 hours  NPO Liquid Status: > 8 hours           Airway   Neck ROM: full  No difficulty expected  Dental      Pulmonary - negative pulmonary ROS   Cardiovascular   Exercise tolerance: good (4-7 METS)        Neuro/Psych- negative ROS  GI/Hepatic/Renal/Endo    (+)   diabetes mellitus type 2 poorly controlled,     Musculoskeletal (-) negative ROS    Abdominal    Substance History - negative use     OB/GYN negative ob/gyn ROS         Other - negative ROS                     Anesthesia Plan    ASA 2 - emergent     general     intravenous induction     Anesthetic plan, all risks, benefits, and alternatives have been provided, discussed and informed consent has been obtained with: patient.

## 2020-06-06 NOTE — PROGRESS NOTES
[facility] Medicine Services  INPATIENT PROGRESS NOTE    Length of Stay: 1  Date of Admission: 6/5/2020  Primary Care Physician: Provider, No Known    Subjective   Chief Complaint: Follow-up facial pain and abscess  HPI   Patient reported grooming himself 3 days earlier and plugged nasal hairs and then developed swelling and severe pain.  He presented to [facility] for evaluation however CT scan revealed loculated abscess midface.  He was transferred to our facility due to no ENT coverage.  Dr. Parra performed I&D of the upper lip.  Packing removed today.  Recommends additional 24 hours of IV antibiotics before transition to oral antibiotics and discharge.    Patient sitting up in bed.  Reports pain is better but upper lip still swollen.  He tells me he is not a diabetic that he had traumatic diabetes due to a left leg abscess.  He had been on insulin transition to metformin and then discontinued.  He is not interested in any diabetic medications at this time.  He denies chest pain, palpitations, or shortness of breath.  He denies nausea, vomiting or abdominal pain.  No fever or chills.    Review of Systems     All pertinent negatives and positives are as above. All other systems have been reviewed and are negative unless otherwise stated.     Objective    Temp:  [98.1 °F (36.7 °C)-100.4 °F (38 °C)] 98.1 °F (36.7 °C)  Heart Rate:  [] 84  Resp:  [10-18] 16  BP: ()/(54-79) 123/67  Physical Exam   Constitutional: He is oriented to person, place, and time.   No distress.  Lying in bed.  No oxygen use.   HENT:   Head: Normocephalic and atraumatic.   Facial swelling and erythema, left nasal. Left upper lip edema.Packing removed per ENT   Eyes:   Blind left eye   Neck: Normal range of motion. Neck supple.   Cardiovascular: Normal rate, regular rhythm, normal heart sounds and intact distal pulses. Exam reveals no gallop and no friction rub.   No murmur  heard.  Pulmonary/Chest: He has no wheezes. He has no rales.   No oxygen in use.   Abdominal: Soft. Bowel sounds are normal. He exhibits no distension. There is no tenderness.   Genitourinary:   Genitourinary Comments: Voiding.   Neurological: He is alert and oriented to person, place, and time.   Psychiatric: He has a normal mood and affect. His behavior is normal. Judgment and thought content normal.     Results Review:  I have reviewed the labs, radiology results, and diagnostic studies.    Laboratory Data:   Results from last 7 days   Lab Units 06/06/20  0311 06/05/20  1242   WBC 10*3/mm3 12.59* 12.5*   HEMOGLOBIN g/dL 13.5 16.1   HEMATOCRIT % 38.6 44.7   PLATELETS 10*3/mm3 197 200        Results from last 7 days   Lab Units 06/05/20  1230   SODIUM mmol/L 133*   CHLORIDE mmol/L 96*   TOTAL CO2 mmol/L 24   BUN mg/dL 18   CREATININE mg/dL 0.7   CALCIUM mg/dL 9.0   BILIRUBIN mg/dL 0.7   ALK PHOS U/L 105   ALT (SGPT) U/L 16   AST (SGOT) U/L 14   GLUCOSE mg/dL 269*     No results found for: BLOODCX, URINECX, WOUNDCX, MRSACX, RESPCX, STOOLCX     CT Soft Tissue Neck W Contrast (06/05/2020 1:56 PM CDT)  Impressions   1. Upper lip cellulitis with midline (and slightly left of midline) multilocular abscess.    Signed by Dr Yuri Husain on 6/5/2020 2:05 PM        Narrative   CT SOFT TISSUE NECK W CONTRAST     6/5/2020 2:00 PM    History: Pain and swelling to the nose and upper lip. Facial infection.    Upper left swelling and skin thickening represents cellulitis changes.    There is a multiloculated abscess collection within the upper lip extending to the base of the nose.    This collection has an aggregate measurement of approximately 20 x 16 x 15 mm.     Nasal septum is uninvolved.    Paranasal sinuses are clear.    Visualized portion of the brain is normal.    Normal nasopharynx and parapharyngeal spaces.    Bilateral submandibular lymph nodes are mildly prominent measuring up o 16 x 10 mm.        XR CHEST  PORTABLE (06/05/2020 11:52 AM CDT)  Narrative   EXAMINATION:  XR CHEST PORTABLE  6/5/2020 11:54 AM    HISTORY: Facial infection and pain.    COMPARISON: No comparison study.    FINDINGS:  The lungs are expanded and clear. The heart size is normal.    No acute bony abnormality is seen.        HISTORY: Facial infection and pain.  COMPARISON: No comparison study.  FINDINGS: The lungs are expanded and clear. The heart size is normal.  No acute bony abnormality is seen.    IMPRESSION:  Portable chest x-ray demonstrates no active disease.    Signed by Dr Ronen Guerrero on 6/5/2020 11:55 AM         Intake/Output    Intake/Output Summary (Last 24 hours) at 6/6/2020 1306  Last data filed at 6/6/2020 0755  Gross per 24 hour   Intake 1023 ml   Output --   Net 1023 ml       Scheduled Meds    doxycycline 100 mg Intravenous Q12H   insulin lispro 2-7 Units Subcutaneous TID AC   oxymetazoline 2 spray Each Nare TID   sodium chloride 10 mL Intravenous Q12H   sodium chloride 2 spray Each Nare 5x Daily       I have reviewed the patient current medications.     Assessment/Plan     Active Hospital Problems    Diagnosis   • Facial abscess   • Cellulitis of face   • Uncontrolled type 2 diabetes mellitus with hyperglycemia (CMS/HCC)   • Facial pain, acute   • Hyponatremia     Plan:  1.  Patient removed nasal hairs 3 days earlier and subsequently developed infection.  Presented to [facility] 6/5 with CT noting facial abscess.  Transferred for ENT evaluation.  2.  Incision and drainage upper lip per  6/5.  Packing removed today.  Discussed with Dr. Parra today and he requests an additional 24 hours of IV antibiotics prior to transition to oral antibiotics and discharge.  3.  Placed on vancomycin and Zosyn on admission.  MRSA screen negative.  COVID-19 negative.  Wound culture gram-positive cocci.  Antibiotics changed to doxycycline 100 mg IV every 12 hours. Await final culture.  4.  Soft texture carbohydrate diet  5.  Hemoglobin A1c  "14.4.  Glucose 205, 218, 269.  Patient reports he is not a diabetic.  He tells me he has \"traumatic diabetes\" due to abscess.  Patient had been on insulin several years ago and transition to metformin and then discontinued.  He is not interested in any diabetic medications at this time.  Plan to obtain glucometer for patient prior to discharge so that he may monitor glucoses.  6.  No PCP.  Recently moved here from [facility].  Will arrange follow-up with Dr. Manning or Nathanael at discharge to establish care as new patient.  7.  [facility], [facility] tomorrow.    His sister, April will assist with decision-making if he is unable to make decisions for himself.  The above documentation resulted from a face-to-face encounter by me Sneha WALTERS, [facility].    Discharge Planning: I expect patient to be discharged to home in 1 day.    SELENA Post   06/06/20   13:06      "

## 2020-06-06 NOTE — OP NOTE
Phillip Parra Jr, MD     OPERATIVE NOTE     Nima De Leon  6/5/2020    Pre-op Diagnosis:   Facial abscess [L02.01]  Cellulitis of face [L03.211]    Post-op Diagnosis:     Post-Op Diagnosis Codes:     * Facial abscess [L02.01]     * Cellulitis of face [L03.211]    Procedure/CPT® Codes:      Procedure(s):  ABSCESS INCISION AND DRAINAGE UPPER LIP    Surgeon(s):  Phillip Parra Jr., MD    Anesthesia:   [id number]    Staff:   Circulator: Geno Arcos RN  Scrub Person: Angela Pascual  Assistant: Carol Strange    Estimated Blood Loss:   5 mL    Specimens:   Cultures- nose, abscess lip      Drains:   Iodophor packing through the nasal drainage site    Findings:  Abscess location: Upper lip Left side into L nasal sill and base of columella    Complications:   none    Assistants:  none    Implants:  Iodophor packing in abscess cavity    Time Out::  A time out was performed to confirm the patient, procedure and laterality.    Reason for the Operation: Nima De Leon is a 49 y.o. male who has had a history of progressive abscess of upper lip.  Preoperative discussion was carried out. Risks, benefits, options for therapy and complications were explained in clear and simple language.    Procedure Description:  The patient was taken back to the operating room, positioned on the operating table and placed under satisfactory anesthesia by the anesthesia staff.      Injection: Lidocaine 1% Epi1:100k- 7 cc upper lip  Approach:  Through the internal aspect of upper lip  Procedure detail:  n incision was made in the internal aspect of the upper lip. The abscess cavity was immediately accessed with drainage of 5 cc of green pus. The septae of the abscess were broken down. The abscess cavity was opened into the nasal aspect of the skin infection site.  The cavity was irrigated with saline.  The cavity was packed with iodophor gauze. A tail was brought out through the nasal skin opening.  The lip was closed with an  interrupted 3-0 chromic suture.    The operative site was inspected for retained foreign bodies and instruments.   Sponge/needle count was Correct  Hemostasis was satisfactory.  The patient was then turned over to the anesthesia team and allowed to wake from anesthesia.     Disposition: The patient was transported to the PACU in Good condition.       Postoperative discussion held with: No one present at end of surgery  Procedure and findings reviewed.  DVT ASSESSMENT CARRIED OUT: Patient is in the immediate post-operative period and is not a candidate for anticoagulation therapy  Patient is at increased risk for bleeding if anticoagulant therapy is used    Phillip Parra Jr, MD      Date: 6/5/2020  Time: 22:25

## 2020-06-06 NOTE — ANESTHESIA POSTPROCEDURE EVALUATION
Patient: Nima De Leon    Procedure Summary     Date:  06/05/20 Room / Location:   [facility] OR  /  [facility] OR    Anesthesia Start:  2149 Anesthesia Stop:  2228    Procedure:  ABSCESS INCISION AND DRAINAGE UPPER LIP (Left ) Diagnosis:       Facial abscess      Cellulitis of face      (Facial abscess [L02.01])      (Cellulitis of face [L03.211])    Surgeon:  Phillip Parra Jr., MD Provider:  GLORY Rudd CRNA    Anesthesia Type:  general ASA Status:  2 - Emergent          Anesthesia Type: general    Vitals  Vitals Value Taken Time   BP     Temp     Pulse 106 6/5/2020 10:28 PM   Resp     SpO2 96 % 6/5/2020 10:28 PM   Vitals shown include unvalidated device data.        Post Anesthesia Care and Evaluation    Patient location during evaluation: PACU  Patient participation: complete - patient participated  Level of consciousness: awake and alert  Pain score: 0  Pain management: adequate  Airway patency: patent  Anesthetic complications: No anesthetic complications    Cardiovascular status: acceptable and stable  Respiratory status: acceptable and unassisted  Hydration status: acceptable
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
## 2020-06-06 NOTE — PLAN OF CARE
Problem: Patient Care Overview  Goal: Plan of Care Review  Flowsheets  Taken 6/6/2020 1610  Progress: improving  Outcome Summary: D/c dressing per pt. HOB >30. Warm moist compresses. Mouth rinse/peroxide QID. Shower today. C/O headache this am relieved with toradol. Cont to monitor  Taken 6/6/2020 0804  Plan of Care Reviewed With: patient

## 2020-06-07 VITALS
SYSTOLIC BLOOD PRESSURE: 105 MMHG | OXYGEN SATURATION: 98 % | DIASTOLIC BLOOD PRESSURE: 62 MMHG | RESPIRATION RATE: 16 BRPM | HEIGHT: 67 IN | TEMPERATURE: 97.9 F | WEIGHT: 185 LBS | HEART RATE: 75 BPM | BODY MASS INDEX: 29.03 KG/M2

## 2020-06-07 LAB
ANION GAP SERPL CALCULATED.3IONS-SCNC: 12 MMOL/L (ref 5–15)
BACTERIA FLD CULT: ABNORMAL
BACTERIA SPEC AEROBE CULT: ABNORMAL
BASOPHILS # BLD AUTO: 0.04 10*3/MM3 (ref 0–0.2)
BASOPHILS NFR BLD AUTO: 0.5 % (ref 0–1.5)
BUN BLD-MCNC: 16 MG/DL (ref 6–20)
BUN/CREAT SERPL: 24.6 (ref 7–25)
CALCIUM SPEC-SCNC: 8.8 MG/DL (ref 8.6–10.5)
CHLORIDE SERPL-SCNC: 98 MMOL/L (ref 98–107)
CO2 SERPL-SCNC: 26 MMOL/L (ref 22–29)
CREAT BLD-MCNC: 0.65 MG/DL (ref 0.76–1.27)
DEPRECATED RDW RBC AUTO: 35.6 FL (ref 37–54)
EOSINOPHIL # BLD AUTO: 0.15 10*3/MM3 (ref 0–0.4)
EOSINOPHIL NFR BLD AUTO: 1.9 % (ref 0.3–6.2)
ERYTHROCYTE [DISTWIDTH] IN BLOOD BY AUTOMATED COUNT: 11.7 % (ref 12.3–15.4)
GFR SERPL CREATININE-BSD FRML MDRD: 130 ML/MIN/1.73
GLUCOSE BLD-MCNC: 298 MG/DL (ref 65–99)
GRAM STN SPEC: ABNORMAL
HCT VFR BLD AUTO: 39.2 % (ref 37.5–51)
HGB BLD-MCNC: 13.6 G/DL (ref 13–17.7)
IMM GRANULOCYTES # BLD AUTO: 0.03 10*3/MM3 (ref 0–0.05)
IMM GRANULOCYTES NFR BLD AUTO: 0.4 % (ref 0–0.5)
LYMPHOCYTES # BLD AUTO: 1.89 10*3/MM3 (ref 0.7–3.1)
LYMPHOCYTES NFR BLD AUTO: 24.3 % (ref 19.6–45.3)
MCH RBC QN AUTO: 29.1 PG (ref 26.6–33)
MCHC RBC AUTO-ENTMCNC: 34.7 G/DL (ref 31.5–35.7)
MCV RBC AUTO: 83.8 FL (ref 79–97)
MONOCYTES # BLD AUTO: 0.49 10*3/MM3 (ref 0.1–0.9)
MONOCYTES NFR BLD AUTO: 6.3 % (ref 5–12)
NEUTROPHILS # BLD AUTO: 5.17 10*3/MM3 (ref 1.7–7)
NEUTROPHILS NFR BLD AUTO: 66.6 % (ref 42.7–76)
NRBC BLD AUTO-RTO: 0 /100 WBC (ref 0–0.2)
PLATELET # BLD AUTO: 225 10*3/MM3 (ref 140–450)
PMV BLD AUTO: 11.8 FL (ref 6–12)
POTASSIUM BLD-SCNC: 4 MMOL/L (ref 3.5–5.2)
RBC # BLD AUTO: 4.68 10*6/MM3 (ref 4.14–5.8)
SODIUM BLD-SCNC: 136 MMOL/L (ref 136–145)
WBC NRBC COR # BLD: 7.77 10*3/MM3 (ref 3.4–10.8)

## 2020-06-07 PROCEDURE — G0378 HOSPITAL OBSERVATION PER HR: HCPCS

## 2020-06-07 PROCEDURE — 63710000001 INSULIN LISPRO (HUMAN) PER 5 UNITS: Performed by: OTOLARYNGOLOGY

## 2020-06-07 PROCEDURE — 85025 COMPLETE CBC W/AUTO DIFF WBC: CPT | Performed by: NURSE PRACTITIONER

## 2020-06-07 PROCEDURE — 80048 BASIC METABOLIC PNL TOTAL CA: CPT | Performed by: NURSE PRACTITIONER

## 2020-06-07 PROCEDURE — 99024 POSTOP FOLLOW-UP VISIT: CPT | Performed by: OTOLARYNGOLOGY

## 2020-06-07 RX ORDER — DOXYCYCLINE 100 MG/1
100 TABLET ORAL EVERY 12 HOURS SCHEDULED
Status: DISCONTINUED | OUTPATIENT
Start: 2020-06-07 | End: 2020-06-07 | Stop reason: HOSPADM

## 2020-06-07 RX ORDER — PEN NEEDLE, DIABETIC 30 GX3/16"
1 NEEDLE, DISPOSABLE MISCELLANEOUS 4 TIMES DAILY
Qty: 100 EACH | Refills: 1 | Status: SHIPPED | OUTPATIENT
Start: 2020-06-07

## 2020-06-07 RX ORDER — ECHINACEA PURPUREA EXTRACT 125 MG
2 TABLET ORAL
Refills: 12
Start: 2020-06-07

## 2020-06-07 RX ORDER — DOXYCYCLINE 100 MG/1
100 TABLET ORAL EVERY 12 HOURS SCHEDULED
Qty: 14 TABLET | Refills: 0 | Status: SHIPPED | OUTPATIENT
Start: 2020-06-07 | End: 2020-06-14

## 2020-06-07 RX ADMIN — DOXYCYCLINE 100 MG: 100 INJECTION, POWDER, LYOPHILIZED, FOR SOLUTION INTRAVENOUS at 08:56

## 2020-06-07 RX ADMIN — Medication 2 SPRAY: at 08:56

## 2020-06-07 RX ADMIN — SODIUM CHLORIDE, PRESERVATIVE FREE 10 ML: 5 INJECTION INTRAVENOUS at 09:01

## 2020-06-07 RX ADMIN — SALINE NASAL SPRAY 2 SPRAY: 1.5 SOLUTION NASAL at 08:56

## 2020-06-07 RX ADMIN — INSULIN LISPRO 4 UNITS: 100 INJECTION, SOLUTION INTRAVENOUS; SUBCUTANEOUS at 09:00

## 2020-06-07 NOTE — PLAN OF CARE
Problem: Patient Care Overview  Goal: Plan of Care Review  Outcome: Ongoing (interventions implemented as appropriate)  Flowsheets (Taken 6/7/2020 0121)  Progress: improving  Plan of Care Reviewed With: patient  Outcome Summary: Denies pain this shift. HOB elevated. Warm mpist compresses in use. Mouth rinse used. Nose spray administered per orders. Decreasing facial swelling this shift. VSS. Safety maintianed. Will continue to monitor

## 2020-06-07 NOTE — DISCHARGE SUMMARY
[facility] ER Medicine Services  DISCHARGE SUMMARY     Date of Admission: 6/5/2020  Date of Discharge:  6/7/2020  Primary Care Physician: Provider, No Known    Presenting Problem/History of Present Illness:  Facial abscess [L02.01]     Discharge Diagnoses:  Active Hospital Problems    Diagnosis   • Facial abscess   • Cellulitis of face   • Uncontrolled type 2 diabetes mellitus with hyperglycemia (CMS/[facility])   • Facial pain, acute   • Hyponatremia     Chief Complaint on Day of Discharge: Feels better.  Upper lip less swollen.  History of Present Illness on Day of Discharge:   Sitting up in bed.  He feels better today.  Upper lip much less swollen today.  He denies nausea, vomiting abdominal pain.  Denies chest pain, palpitations, shortness of breath.  Discussed insulin therapy with patient and he is agreeable.  He has taking insulin in the past.    Consults: Dr. Parra, ENT    Procedures Performed:   Incision and drainage upper lip abscess 6/5/2020, Dr. Parra    Pertinent Test Results:   Laboratory Data:    Results from last 7 days   Lab Units 06/07/20  0325 06/06/20  0311 06/05/20  1242   WBC 10*3/mm3 7.77 12.59* 12.5*   HEMOGLOBIN g/dL 13.6 13.5 16.1   HEMATOCRIT % 39.2 38.6 44.7   PLATELETS 10*3/mm3 225 197 200        Results from last 7 days   Lab Units 06/07/20  0325 06/05/20  1230   SODIUM mmol/L 136 133*   POTASSIUM mmol/L 4.0  --    CHLORIDE mmol/L 98 96*   TOTAL CO2 mmol/L  --  24   CO2 mmol/L 26.0  --    BUN mg/dL 16 18   CREATININE mg/dL 0.65* 0.7   CALCIUM mg/dL 8.8 9.0   BILIRUBIN mg/dL  --  0.7   ALK PHOS U/L  --  105   ALT (SGPT) U/L  --  16   AST (SGOT) U/L  --  14   GLUCOSE mg/dL 298* 269*     Lab Results (all)     Procedure Component Value Units Date/Time    Wound Culture - Wound, Nose [[id number]]  (Abnormal)  (Susceptibility) Collected:  06/05/20 2213    Specimen:  Wound from Nose Updated:  06/07/20 0727     Wound Culture Light growth (2+) Staphylococcus aureus       Gram Stain Rare (1+) WBCs seen      Rare (1+) Gram positive cocci    Susceptibility      Staphylococcus aureus     ANITA     Tetracycline Susceptible     Trimethoprim + Sulfamethoxazole Susceptible                    Body Fluid Culture - Body Fluid, Lip, Upper [[id number]]  (Abnormal)  (Susceptibility) Collected:  06/05/20 2209    Specimen:  Body Fluid from Lip, Upper Updated:  06/07/20 0727     Body Fluid Culture Light growth (2+) Staphylococcus aureus     Gram Stain Many (4+) WBCs seen      Moderate (3+) Gram positive cocci    Susceptibility      Staphylococcus aureus     ANITA     Gentamicin Susceptible     Oxacillin Susceptible     Rifampin Susceptible     Vancomycin Susceptible                Susceptibility Comments     Staphylococcus aureus    This isolate does not demonstrate inducible clindamycin resistance in vitro.    This isolate does not demonstrate inducible clindamycin resistance in vitro.               Basic Metabolic Panel [[id number]]  (Abnormal) Collected:  06/07/20 0325    Specimen:  Blood Updated:  06/07/20 0436     Glucose 298 mg/dL      BUN 16 mg/dL      Creatinine 0.65 mg/dL      Sodium 136 mmol/L      Potassium 4.0 mmol/L      Chloride 98 mmol/L      CO2 26.0 mmol/L      Calcium 8.8 mg/dL      eGFR Non African Amer 130 mL/min/1.73      BUN/Creatinine Ratio 24.6     Anion Gap 12.0 mmol/L     Narrative:       GFR Normal >60  Chronic Kidney Disease <60  Kidney Failure <15      CBC & Differential [[id number]] Collected:  06/07/20 0325    Specimen:  Blood Updated:  06/07/20 0419    Narrative:       The following orders were created for panel order CBC & Differential.  Procedure                               Abnormality         Status                     ---------                               -----------         ------                     CBC Auto Differential[[id number]]        Abnormal            Final result                 Please view results for these tests on the individual orders.    CBC Auto  Differential [[id number]]  (Abnormal) Collected:  06/07/20 0325    Specimen:  Blood Updated:  06/07/20 0419     WBC 7.77 10*3/mm3      RBC 4.68 10*6/mm3      Hemoglobin 13.6 g/dL      Hematocrit 39.2 %      MCV 83.8 fL      MCH 29.1 pg      MCHC 34.7 g/dL      RDW 11.7 %      RDW-SD 35.6 fl      MPV 11.8 fL      Platelets 225 10*3/mm3      Neutrophil % 66.6 %      Lymphocyte % 24.3 %      Monocyte % 6.3 %      Eosinophil % 1.9 %      Basophil % 0.5 %      Immature Grans % 0.4 %      Neutrophils, Absolute 5.17 10*3/mm3      Lymphocytes, Absolute 1.89 10*3/mm3      Monocytes, Absolute 0.49 10*3/mm3      Eosinophils, Absolute 0.15 10*3/mm3      Basophils, Absolute 0.04 10*3/mm3      Immature Grans, Absolute 0.03 10*3/mm3      nRBC 0.0 /100 WBC     POC Glucose Once [[id number]]  (Abnormal) Collected:  06/06/20 1637    Specimen:  Blood Updated:  06/06/20 1700     Glucose 304 mg/dL      Comment: : [id number] [employer]rom[employer]inaMeter ID: [id number]       POC Glucose Once [[id number]]  (Abnormal) Collected:  06/06/20 1140    Specimen:  Blood Updated:  06/06/20 1203     Glucose 205 mg/dL      Comment: : [id number] [employer]sen[employer]eter ID: [id number]       POC Glucose Once [[id number]]  (Abnormal) Collected:  06/06/20 0758    Specimen:  Blood Updated:  06/06/20 0810     Glucose 218 mg/dL      Comment: : [id number] [employer]rom[employer]inaMeter ID: [id number]       Lipid Panel [[id number]] Collected:  06/06/20 0311    Specimen:  Blood Updated:  06/06/20 0436     Total Cholesterol 155 mg/dL      Triglycerides 110 mg/dL      HDL Cholesterol 47 mg/dL      LDL Cholesterol  86 mg/dL      VLDL Cholesterol 22 mg/dL      LDL/HDL Ratio 1.83    Narrative:       Cholesterol Reference Ranges  (U.S. Department of Health and Human Services ATP III Classifications)    Desirable          <200 mg/dL  Borderline High    200-239 mg/dL  High Risk          >240 mg/dL      Triglyceride Reference Ranges  (U.S. Department of Health and Human Services  ATP III Classifications)    Normal           <150 mg/dL  Borderline High  150-199 mg/dL  High             200-499 mg/dL  Very High        >500 mg/dL    HDL Reference Ranges  (U.S. Department of Health and Human Services ATP III Classifcations)    Low     <40 mg/dl (major risk factor for CHD)  High    >60 mg/dl ('negative' risk factor for CHD)        LDL Reference Ranges  (U.S. Department of Health and Human Services ATP III Classifcations)    Optimal          <100 mg/dL  Near Optimal     100-129 mg/dL  Borderline High  130-159 mg/dL  High             160-189 mg/dL  Very High        >189 mg/dL    CBC Auto Differential [[id number]]  (Abnormal) Collected:  06/06/20 0311    Specimen:  Blood Updated:  06/06/20 0419     WBC 12.59 10*3/mm3      RBC 4.63 10*6/mm3      Hemoglobin 13.5 g/dL      Hematocrit 38.6 %      MCV 83.4 fL      MCH 29.2 pg      MCHC 35.0 g/dL      RDW 11.8 %      RDW-SD 35.5 fl      MPV 12.2 fL      Platelets 197 10*3/mm3      Neutrophil % 75.3 %      Lymphocyte % 15.0 %      Monocyte % 8.8 %      Eosinophil % 0.2 %      Basophil % 0.2 %      Immature Grans % 0.5 %      Neutrophils, Absolute 9.47 10*3/mm3      Lymphocytes, Absolute 1.89 10*3/mm3      Monocytes, Absolute 1.11 10*3/mm3      Eosinophils, Absolute 0.03 10*3/mm3      Basophils, Absolute 0.03 10*3/mm3      Immature Grans, Absolute 0.06 10*3/mm3      nRBC 0.0 /100 WBC     MRSA Screen, PCR (Inpatient) - Swab, Nares [[id number]]  (Normal) Collected:  06/05/20 2055    Specimen:  Swab from Nares Updated:  06/05/20 2240     MRSA PCR No MRSA Detected    Anaerobic Culture - Body Fluid, Lip, Upper [[id number]] Collected:  06/05/20 2209    Specimen:  Body Fluid from Lip, Upper Updated:  06/05/20 2228    COVID-19, [facility]BBOTT IN-HOUSE,NP Swab (NO TRANSPORT MEDIA) 2 HR TAT - Swab, Nasopharynx [[id number]]  (Normal) Collected:  06/05/20 2055    Specimen:  Swab from Nasopharynx Updated:  06/05/20 2136     COVID19 Not Detected        Culture Data:   Wound Culture      Date Value Ref Range Status   06/05/2020 Light growth (2+) Staphylococcus aureus (A)  Final      Staphylococcus aureus     ANITA     Tetracycline <=1  Susceptible     Trimethoprim + Sulfamethoxazole <=10  Susceptible      Staphylococcus aureus       ANITA     Gentamicin <=0.5  Susceptible     Oxacillin <=0.25  Susceptible     Rifampin <=0.5  Susceptible     Vancomycin 1  Susceptible      Imaging Results (Last 24 Hours)     ** No results found for the last 24 hours. **        Pertinent Data: Obtained at [facility]      Urinalysis Reflex to Culture (06/05/2020 3:00 PM CDT)  Component Value Ref Range   Color, UA YELLOW Straw/Yellow   Clarity, UA Clear Clear   Glucose, Ur >=1000 (H) Negative mg/dL   Bilirubin Urine Negative Negative   Ketones, Urine 80 (A) Negative mg/dL   Specific Gravity, UA >1.045 1.005 - 1.030   Blood, Urine Negative Negative   pH, UA 5.5 5.0 - 8.0   Protein, UA TRACE (A) Negative mg/dL   Urobilinogen, Urine 0.2 <2.0 E.U./dL   Nitrite, Urine Negative Negative   Leukocyte Esterase, Urine Negative Negative      Urine, clean catch      CBC Auto Differential (06/05/2020 12:42 PM CDT)  Component Value Ref Range   WBC 12.5 (H) 4.8 - 10.8 K/uL   RBC 5.38 4.70 - 6.10 M/uL   Hemoglobin 16.1 14.0 - 18.0 g/dL   Hematocrit 44.7 42.0 - 52.0 %   MCV 83.1 80.0 - 94.0 fL   MCH 29.9 27.0 - 31.0 pg   MCHC 36.0 33.0 - 37.0 g/dL   RDW 11.9 11.5 - 14.5 %   Platelets 200 130 - 400 K/uL   MPV 11.9 9.4 - 12.4 fL   Neutrophils % 76.6 (H) 50.0 - 65.0 %   Lymphocytes % 14.7 (L) 20.0 - 40.0 %   Monocytes % 7.4 0.0 - 10.0 %   Eosinophils % 0.4 0.0 - 5.0 %   Basophils % 0.4 0.0 - 1.0 %   Neutrophils Absolute 9.6 (H) 1.5 - 7.5 K/uL   Immature Granulocytes # 0.1 K/uL   Lymphocytes Absolute 1.8 1.1 - 4.5 K/uL   Monocytes Absolute 0.90 0.00 - 0.90 K/uL   Eosinophils Absolute 0.10 0.00 - 0.60 K/uL   Basophils Absolute 0.10 0.00 - 0.20 K/uL      Component Value Ref Range   Acetone, Bld Negative Negative      Hemoglobin A1C    14.4  (H)    4.0 - 6.0 %      Lactic Acid 1.1 0.5 - 1.9 mmol/L      CRP 19.23 (H) 0.00 - 0.50 mg/dL      Component Value Ref Range   Sodium 133 (L) 136 - 145 mmol/L   Potassium reflex Magnesium 4.2 3.5 - 5.0 mmol/L   Chloride 96 (L) 98 - 111 mmol/L   CO2 24 22 - 29 mmol/L   Anion Gap 13 7 - 19 mmol/L   Glucose 269 (H) 74 - 109 mg/dL   BUN 18 6 - 20 mg/dL   CREATININE 0.7 0.5 - 1.2 mg/dL   GFR Non-African American >60 >60   Calcium 9.0 8.6 - 10.0 mg/dL   Total Protein 7.3 6.6 - 8.7 g/dL   Alb 3.9 3.5 - 5.2 g/dL   Total Bilirubin 0.7 0.2 - 1.2 mg/dL   Alkaline Phosphatase 105 40 - 130 U/L   ALT 16 5 - 41 U/L   AST 14 5 - 40 U/L      Imaging Results     CT Soft Tissue Neck W Contrast (06/05/2020 1:56 PM CDT)  Impressions   1. Upper lip cellulitis with midline (and slightly left of midline) multilocular abscess.    Signed by Dr Yuri Husain on 6/5/2020 2:05 PM        Narrative   CT SOFT TISSUE NECK W CONTRAST     6/5/2020 2:00 PM    History: Pain and swelling to the nose and upper lip. Facial infection.    Upper left swelling and skin thickening represents cellulitis changes.    There is a multiloculated abscess collection within the upper lip extending to the base of the nose.    This collection has an aggregate measurement of approximately 20 x 16 x 15 mm.     Nasal septum is uninvolved.    Paranasal sinuses are clear.    Visualized portion of the brain is normal.    Normal nasopharynx and parapharyngeal spaces.    Bilateral submandibular lymph nodes are mildly prominent measuring up o 16 x 10 mm.        XR CHEST PORTABLE (06/05/2020 11:52 AM CDT)  Narrative   EXAMINATION:  XR CHEST PORTABLE  6/5/2020 11:54 AM    HISTORY: Facial infection and pain.    COMPARISON: No comparison study.    FINDINGS:  The lungs are expanded and clear. The heart size is normal.    No acute bony abnormality is seen.        HISTORY: Facial infection and pain.  COMPARISON: No comparison study.  FINDINGS: The lungs are expanded and clear.  The heart size is normal.  No acute bony abnormality is seen.    IMPRESSION:  Portable chest x-ray demonstrates no active disease.    Signed by Dr Ronen Guerrero on 6/5/2020 11:55 AM       Hospital Course  Patient is a 49 y.o. male transferred to [facility] from [facility] for facial pain with facial abscess requiring ENT evaluation.  He has a past medical history significant for untreated diabetes mellitus.  Patient reports blood sugars were elevated in the past due to stress and infection.  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  Patient recently moved to this region from [facility].  Patient was grooming himself 3 days earlier and remove nasal hairs and developed infection.  He presented to [facility] emergency room with facial abscess.  Patient was complaining of pain level 10 out of 10.  Lab work revealed hemoglobin A1c 14.1, mild hyponatremia, leukocytosis with left shift.  Was transferred to our facility for ENT surgical intervention due to lack of coverage for ENT surgeon at outside facility.    He was admitted to the medical floor with facial abscess and cellulitis and elevated blood sugars with hyperglycemia.  He was made nothing by mouth, SCDs for deep vein thrombosis prophylaxis.  IV fluids started as patient was n.p.o.  ENT consulted and he was seen by  with impression acute upper lip and nasal abscess.  Incision and drainage discussed with patient and he was agreeable.  On 6/5 Dr. Parra performed incision and drainage of upper lip abscess.  Patient had packing through the nasal drainage site.  Vancomycin and Zosyn started on admission.  Patient was transitioned to IV doxycycline.  Nasal packing removed on 6/6/2020.  Dr. Parra felt as though patient need an additional 24 hours of IV antibiotics prior to discharge.  Again, antibiotics changed to doxycycline IV.  Wound culture light growth staph aureus and wound culture upper lip  "light growth staph aureus.  Patient was transitioned to oral doxycycline at discharge.  He will follow-up with Dr. Parra as needed.  On 6/7 upper lip edema much improved.  Erythema essentially resolved.  Patient will continue local wound care per ENT recommendations.    Hemoglobin A1c 14.1.  Patient reported he had taken insulin in the past and was transitioned to metformin and then discontinued.  He reported that blood sugars were elevated due to traumatic injury.  Initially, he was reluctant to start insulin.  However, after further discussing hemoglobin A1c of 14.1 patient was agreeable to initiating insulin therapy nightly.  Again, he has taken insulin in the past and is familiar with insulin injection.  Glucose monitoring glucometer provided to patient at discharge.  He was started on Levemir 10 units nightly.  He has been advised to monitor blood sugars 3-4 times daily.    Patient recently relocated to this region from [facility] and has no primary care provider.  We will arrange follow-up with Dr. Manning/Nathanael/Maday in 1 week to establish care as new patient and further adjust insulin as needed.    On 6/7/2020 he is stable for discharge.  He has been evaluated by Dr. Parra today and local wound care instructions have been provided by ENT service.  Left upper lip edema much improved.  Erythema essentially resolved.  Patient is been advised to complete antibiotic treatment with doxycycline after discharge.  He has been started on Levemir 10 units nightly.  He is tolerating diabetic diet without nausea, vomiting or abdominal pain.  Denies chest pain, palpitations, or shortness of breath.    Physical Exam on Discharge:  /62 (BP Location: Left arm, Patient Position: Sitting)   Pulse 75   Temp 97.9 °F (36.6 °C)   Resp 16   Ht 170.2 cm (67\")   Wt 83.9 kg (185 lb)   SpO2 98%   BMI 28.98 kg/m²   Physical Exam   Constitutional: He is oriented to person, place, and time.   No acute " distress.  Sitting up in chair.  No oxygen use.  No family in room.   HENT:   Upper lip swelling improved almost resolved.  Left facial swelling resolved, erythema resolved left facial region   Eyes: Pupils are equal, round, and reactive to light. EOM are normal.   Neck: Normal range of motion. Neck supple.   Cardiovascular: Normal rate, regular rhythm, normal heart sounds and intact distal pulses. Exam reveals no gallop and no friction rub.   No murmur heard.  Pulmonary/Chest: Effort normal and breath sounds normal. He has no wheezes. He has no rales.   Abdominal: Soft. Bowel sounds are normal. He exhibits no distension. There is no tenderness.   Genitourinary:   Genitourinary Comments: Voiding.   Musculoskeletal: He exhibits edema (Minimal upper lip edema). He exhibits no tenderness or deformity.   Neurological: He is alert and oriented to person, place, and time.   Skin: No rash noted. No pallor.   Erythema essentially resolved   Psychiatric: His behavior is normal. Judgment and thought content normal.     Condition on Discharge: Stable    Discharge Disposition: Home    Discharge Diet:   Diet Instructions     Diet: Consistent Carbohydrate      Discharge Diet:  Consistent Carbohydrate        Activity at Discharge:   Activity Instructions     Activity as Tolerated          Discharge Care Plan / Instructions:   1.  For worsening facial swelling or edema seek medical attention.  2.  Doxycycline 100 mg twice daily.  3.  Follow-up with Dr. Parra as needed.  4.  Levemir 10 units nightly.  5.  Follow-up with Dr. Manning/Nathanael/Maday 1 week to establish care as new patient.  6.  Wound care discharge instructions given per Dr. Parra    Discharge Medications:     Discharge Medications      New Medications      Instructions Start Date   doxycycline 100 MG tablet  Commonly known as:  ADOXA   100 mg, Oral, Every 12 Hours Scheduled      insulin detemir 100 UNIT/ML injection  Commonly known as:  LEVEMIR   10 Units,  Subcutaneous, Nightly      Pen Needles 32G X 4 MM misc   1 each, Subcutaneous, 4 Times Daily      sodium chloride 0.65 % nasal spray   2 sprays, Nasal, 5 Times Daily         Continue These Medications      Instructions Start Date   ibuprofen 200 MG tablet  Commonly known as:  ADVIL,MOTRIN   200 mg, Oral, Every 6 Hours PRN           Follow-up Appointments:   Follow-up Information     Servando Manning, DO Follow up.    Specialties:  Internal Medicine, Emergency Medicine  Why:  Nigel/Nathanael/Maday 1 week as new patient.  Started on insulin.  A1c greater than 14  Contact information:  [address]  [address]  [place] [address]  [phone]             Phillip Parra Jr., MD Follow up.    Specialty:  Otolaryngology  Why:  prn  Contact information:  [address]  [address]  [place] [address]  [phone]             Provider, No Known .    Contact information:  [facility] [id number] [phone]                 Future Appointments:  No future appointments.    Test Results Pending at Discharge: None    The above documentation resulted from a face-to-face encounter by me Sneha WALTERS, [facility].    SELENA Post  06/07/20  09:44    Time: This discharge process required 35 minutes for completion.    Plan discussed with Dr. Teran, , and patient.    Time spent in face-to-face evaluation, chart review, planning and education 35 minutes.

## 2020-06-07 NOTE — PLAN OF CARE
Problem: Patient Care Overview  Goal: Plan of Care Review  Outcome: Ongoing (interventions implemented as appropriate)  Flowsheets (Taken 6/7/2020 1016)  Plan of Care Reviewed With: patient  Outcome Summary: Ntn assessment per ntn screen upon admission for unintentional wt loss and DME protocol. Pt reports appetite is good, wt loss was intentional since last September. Pt reports he was DM before and had information he will look at. RD discussed basic DM diet over phone. Encouraged consistent meals, discussed foods that contain carbohydrates and discussed how physical activity affets blood glucose. RD offered to mail pt DM diet education material pt refused at this time stating his family knew what he needed to eat and has prior material at home. Cont to follow for ntn plan of care.

## 2020-06-07 NOTE — PROGRESS NOTES
Phillip Parra Jr, MD     OTOLARYNGOLOGY, HEAD AND NECK SURGERY PROGRESS NOTE   Referring Provider: Laci Teran MD  Reason for Consultation: Midface abscess  Location: 394/1  Accompanied by: No one  Chief complaint: No chief complaint on file.        Interval History:   Since last examined, Nima De Leon has improvd. Lip is resolving and feels much better. He denies lip pain  He wishes to go home.  Patient seen. Chart reviewed.      Review of Systems:   No change from prior inquiry    Vital Signs  Temp:  [97.9 °F (36.6 °C)-98.6 °F (37 °C)] 97.9 °F (36.6 °C)  Heart Rate:  [69-84] 75  Resp:  [16] 16  BP: (105-123)/(62-69) 105/62        EXAMINATION:  CONSTITUTIONAL:    well nourished, well-developed, alert, oriented, in no acute distress   Sitting in chair talking on phone     BODY HABITUS:    Normal body habitus     COMMUNICATION:    Improved     HEAD:     Normocephalic, without obvious abnormality, atraumatic     FACE:    Midface swelling resolved     SALIVARY GLANDS:    parotid glands with no tenderness, no swelling, no masses, submandibular glands with normal size, nontender      EYE:      PERRL    EOM- normal  Bilateral     HEARING:    response to conversational voice normal     EARS:    Otoscopic exam    PINNA:     normal, non-tender, skin intact without lesions      NOSE EXTERNAL:    APPEARANCE: mild edema of columella into upper lip, mild crusting, no drainage  NOSE INTERNAL:    Anterior rhinoscopy   NASALMUCOSA:    abnormal:        Bilateral- edema in columella resolving     ORAL CAVITY:      LIPS:      upper lip- edema resolved, firm tissue, internal surface closed with suture, healing well      lower lip- normal     TEETH:       edentulous:  upper      missing teeth:  lower      Repair: fair    GUMS:      intact but upper anterior sulcus tender    ORAL MUCOSA:       abnormal: inner upper lip edema    FLOOR OF MOUTH:       Warthin's duct patent, mucosa normal  TONGUE:       lingual mucosa normal  without lesions, normal tongue mobility    PALATE:       hard palate with normal mucosa and structure      soft palate with normal mucosa and structure, normal mobility uvula intact     OROPHARYNX:    Direct examination  oropharyngeal mucosa normal, tonsil(s) with normal appearance       NECK:    normal appearance, no masses, no lesions, larynx normal mobility, trachea midline     LYMPH NODES :    no adenopathy     THYROID:    no overt thyromegaly, no tenderness, nodules or mass present on palpation, position midline     CHEST/RESPIRATORY:    respiratory effort normal, no rales, rubs or wheezing, no stridor, normal appearance to chest     CARDIOVASCULAR:    regular rate and rhythm, no murmurs, gallups, no peripheral edema     NEURO/PSYCHIATRIC :    oriented appropriately for age, mood normal, affect appropriate, cranial nerves intact grossly (unless specifically described), gait normal for age     Results Review:       I reviewed the patient's new clinical results.    Medications, Allergies and Past History Reviewed      Assessment       Facial abscess    Cellulitis of face    Uncontrolled type 2 diabetes mellitus with hyperglycemia (CMS/HCC)    Facial pain, acute    Hyponatremia         Plan      Patient is markedly better. Midface resolving as expected.   Recommend discharge from ENT standpoint. I expect complete recovery now. Wound is healing well.  Doxy for 10 day completion  DM control  Wound care to nose  Oral rinses/gargles for intraoral wound  NO pulling nasal hairs  Return to work as desired  Follow up with ENT as needed    Patient understand(s) and agree(s) with the treatment plan as described.  Written and oral instructions reviewed.      Phillip Parra Jr, MD  06/07/20  09:45

## 2020-06-09 NOTE — PAYOR COMM NOTE
"PATIENT WAS IN OBSERVATION STATUS, NOT AN INPATIENT    UR  [id number]    Timothy Davila (49 y.o. Male)     Date of Birth Social Security Number Address Home Phone MRN    1970  [address] [place] [address] [phone] [id number]    Yarsanism Marital Status          Yazidism Single       Admission Date Admission Type Admitting Provider Attending Provider Department, Room/Bed    6/5/20 Urgent Laci Teran MD  [facility] 3C, 394/1    Discharge Date Discharge Disposition Discharge Destination        6/7/2020 Home or Self Care              Attending Provider:  (none)   Allergies:  No Known Allergies    Isolation:  None   Infection:  COVID (rule out) (06/05/20)   Code Status:  Prior    Ht:  170.2 cm (67\")   Wt:  83.9 kg (185 lb)    Admission Cmt:  None   Principal Problem:  None                Active Insurance as of 6/5/2020     Primary Coverage     Payor Plan Insurance Group Employer/Plan Group    [facility]     Payor Plan Address Payor Plan Phone Number Payor Plan Fax Number Effective Dates    [address] [address]   1/1/2020 - None Entered    [facility] [id number]       Subscriber Name Subscriber Birth Date Member ID       TIMOTHY DAVILA 1970 [id number]                 Emergency Contacts      (Rel.) Home Phone Work Phone Mobile Phone    JHONANDREY [phone] -- --            Orders (all)      Start     Ordered    06/07/20 2100  insulin detemir (LEVEMIR) injection 10 Units  Nightly,   Status:  Discontinued      06/07/20 0919    06/07/20 2100  doxycycline (ADOXA) tablet 100 mg  Every 12 Hours Scheduled,   Status:  Discontinued      06/07/20 0922    06/07/20 0930  Discontinue IV  Once,   Status:  Canceled      06/07/20 0943    06/07/20 0926  Discharge patient  Once      06/07/20 0943    06/07/20 0820  Diabetes Educator Consult  Once,   Status:  Canceled     Provider:  (Not yet assigned)    06/07/20 0819    06/07/20 0600  CBC & " Differential  Morning Draw      06/06/20 1324    06/07/20 0600  Basic Metabolic Panel  Morning Draw      06/06/20 1324    06/07/20 0600  CBC Auto Differential  PROCEDURE ONCE      06/07/20 0002    06/07/20 0000  doxycycline (ADOXA) 100 MG tablet  Every 12 Hours Scheduled      06/07/20 0943    06/07/20 0000  sodium chloride 0.65 % nasal spray  5 Times Daily      06/07/20 0943    06/07/20 0000  Activity as Tolerated      06/07/20 0943    06/07/20 0000  Diet: Consistent Carbohydrate      06/07/20 0943    06/07/20 0000  Insulin Pen Needle (PEN NEEDLES) 32G X 4 MM misc  4 Times Daily      06/07/20 0943    06/07/20 0000  insulin detemir (LEVEMIR) 100 UNIT/ML injection  Nightly      06/07/20 0943    06/07/20 0000  Discharge Instructions     Comments:  1.  For worsening facial swelling or edema seek medical attention.  2.  Doxycycline 1 mg twice daily for 7 days  3.  Follow-up with Dr. Parra as needed.  4.  Levemir 10 units nightly.  5.  Follow-up with Dr. Manning/Nathanael/Maday 1 week to establish care as new patient.  6.  Wound care discharge instructions given per Dr. Parra    06/07/20 0943    06/07/20 0000  Check Blood Glucose - Fingerstick      06/07/20 0943    06/06/20 2300  Vancomycin, Trough  Timed,   Status:  Canceled      06/05/20 1947    06/06/20 1701  POC Glucose Once  Once      06/06/20 1637    06/06/20 1700  POC Glucose 4x Daily AC & at Bedtime  4 Times Daily Before Meals & at Bedtime,   Status:  Canceled      06/06/20 1324    06/06/20 1204  POC Glucose Once  Once      06/06/20 1140    06/06/20 1200  Oral Care  Every 4 Hours,   Status:  Canceled     Comments:  Mix mouthwash and peroxide 50:50, swish and spit QID and PRN    06/06/20 1006    06/06/20 1200  Oral Care  Every 4 Hours,   Status:  Canceled     Comments:  Normal saline swish and spit    06/06/20 1006    06/06/20 1107  Diet Soft Texture; Whole Foods; Consistent Carbohydrate  Diet Effective Now,   Status:  Canceled      06/06/20 1107    06/06/20  1100  oxymetazoline (AFRIN) nasal spray 2 spray  3 Times Daily,   Status:  Discontinued      06/06/20 1006    06/06/20 1100  sodium chloride nasal spray 2 spray  5 Times Daily,   Status:  Discontinued      06/06/20 1006    06/06/20 1007  Patient May Shower  Once     Comments:  Protect IV    06/06/20 1006    06/06/20 1006  Elevate HOB  Continuous,   Status:  Canceled      06/06/20 1006    06/06/20 1006  Apply Heat To Affected Area  Continuous,   Status:  Canceled     Comments:  Moist heat to upper lip    06/06/20 1006    06/06/20 1005  sodium chloride nasal spray 2 spray  Continuous PRN,   Status:  Discontinued      06/06/20 1006    06/06/20 1005  Oral Care  As Needed,   Status:  Canceled     Comments:  Normal saline swish and spit as desired by patient    06/06/20 1006    06/06/20 0930  doxycycline (VIBRAMYCIN) 100 mg/100 mL 0.9% NS MBP  Every 12 Hours Scheduled,   Status:  Discontinued      06/06/20 0838    06/06/20 0811  POC Glucose Once  Once      06/06/20 0758    06/06/20 0800  Chlorhexidine Shower / Bath BID Day Before Surgery  2 Times Daily,   Status:  Canceled     Comments:  (If Not Completed Prior) Chlorhexidine Skin Prep and Instructions For All Patients Having A Procedure Requiring an Outward Incision if Not Allergic.  If Allergic, Give Antibacterial Skin Wipes and Instructions.  Do Not Use For Facial Cases or on Any Mucus Membranes.    06/05/20 2025 06/06/20 0730  insulin lispro (humaLOG) injection 2-7 Units  3 Times Daily Before Meals,   Status:  Discontinued      06/05/20 1935 06/06/20 0600  CBC Auto Differential  Morning Draw      06/05/20 1935 06/06/20 0600  Comprehensive Metabolic Panel  Morning Draw,   Status:  Canceled      06/05/20 1935 06/06/20 0600  Lipid Panel  Morning Draw      06/05/20 1935 06/06/20 0300  piperacillin-tazobactam (ZOSYN) 3.375 g in iso-osmotic dextrose 50 ml (premix)  Every 8 Hours,   Status:  Discontinued      06/05/20 1946    06/06/20 0000  vancomycin 1500  "mg/500 mL 0.9% NS IVPB ([facility])  Every 12 Hours,   Status:  Discontinued     Note to Pharmacy:  Care Everywhere patient wgt at tc today was 185 lbs and 5'7\" and Scr= 0.7.    06/05/20 1946    06/06/20 0000  Diabetes Educator Consult  Once,   Status:  Canceled     Provider:  (Not yet assigned)    06/05/20 2150 06/05/20 2226  Vital signs every 5 minutes for 15 minutes, every 15 minutes thereafter.  Once,   Status:  Canceled      06/05/20 2225    06/05/20 2226  Call Anesthesiologist for additional IV Fluid bolus for Hypotension/Tachycardia  Continuous,   Status:  Canceled      06/05/20 2225 06/05/20 2226  Notify Anesthesia of Any Acute Changes in Patient Condition  Until Discontinued,   Status:  Canceled      06/05/20 2225 06/05/20 2226  Notify Anesthesia for Unrelieved Pain  Until Discontinued,   Status:  Canceled      06/05/20 2225 06/05/20 2226  Once DC criteria to floor met, follow surgeon's orders.  Until Discontinued,   Status:  Canceled      06/05/20 2225 06/05/20 2226  Discharge patient from PACU when discharge criteria is met.  Until Discontinued,   Status:  Canceled      06/05/20 2225 06/05/20 2225  Apply warming blanket  As Needed,   Status:  Canceled     Comments:  For a recorded temp of <36.9 C    06/05/20 2225 06/05/20 2225  oxyCODONE-acetaminophen (PERCOCET)  MG per tablet 1 tablet  Once As Needed,   Status:  Discontinued      06/05/20 2225 06/05/20 2225  Morphine sulfate (PF) injection 2 mg  Every 10 Minutes PRN,   Status:  Discontinued      06/05/20 2225 06/05/20 2225  oxyCODONE-acetaminophen (PERCOCET) 7.5-325 MG per tablet 2 tablet  Once As Needed      06/05/20 2225 06/05/20 2225  fentaNYL citrate (PF) (SUBLIMAZE) injection 25 mcg  Every 5 Minutes PRN,   Status:  Discontinued      06/05/20 2225 06/05/20 2225  labetalol (NORMODYNE,TRANDATE) injection 5 mg  Every 5 Minutes PRN,   Status:  Discontinued      06/05/20 2225 06/05/20 2225  atropine sulfate " injection 0.5 mg  Once As Needed,   Status:  Discontinued      06/05/20 2225 06/05/20 2225  naloxone (NARCAN) injection 0.04 mg  As Needed,   Status:  Discontinued      06/05/20 2225 06/05/20 2225  flumazenil (ROMAZICON) injection 0.2 mg  As Needed,   Status:  Discontinued      06/05/20 2225 06/05/20 2225  ondansetron (ZOFRAN) injection 4 mg  As Needed,   Status:  Discontinued      06/05/20 2225 06/05/20 2214  Anaerobic Culture - Wound, Nose      06/05/20 2214 06/05/20 2214  Gram Stain      06/05/20 2214 06/05/20 2214  Wound Culture - Wound, Nose      06/05/20 2214 06/05/20 2213  Body Fluid Culture - Body Fluid, Lip, Upper      06/05/20 2213 06/05/20 2213  Anaerobic Culture - Body Fluid, Lip, Upper      06/05/20 2213 06/05/20 2213  Gram Stain      06/05/20 2213 06/05/20 2205  Wound Culture - Wound, Nose  Status:  Canceled      06/05/20 2205 06/05/20 2200  Incentive Spirometry  Every 4 Hours While Awake,   Status:  Canceled      06/05/20 1935 06/05/20 2153  Initiate Observation Status  Once      06/05/20 2153 06/05/20 2141  HYDROmorphone (DILAUDID) injection 0.5 mg  Every 2 Hours PRN,   Status:  Discontinued      06/05/20 2142 06/05/20 2133  lidocaine-EPINEPHrine (XYLOCAINE W/EPI) 1 %-1:982423 injection  As Needed,   Status:  Discontinued      06/05/20 2133 06/05/20 2117  COVID-19, ABBOTT IN-HOUSE,NP Swab (NO TRANSPORT MEDIA) 2 HR TAT - Swab, Nasopharynx  Once      06/05/20 2116 06/05/20 2100  sodium chloride 0.9 % flush 10 mL  Every 12 Hours Scheduled,   Status:  Discontinued      06/05/20 1935 06/05/20 2100  clindamycin (CLEOCIN) 600 mg in dextrose 5% 50 mL IVPB (premix)  Every 8 Hours,   Status:  Discontinued     Note to Pharmacy:  First dose given at 1240 at [facility]    06/05/20 1935 06/05/20 2100  piperacillin-tazobactam (ZOSYN) 3.375 g in iso-osmotic dextrose 50 ml (premix)  Once,   Status:  Discontinued      06/05/20 1946 06/05/20 2024   COVID-19,CEPHEID,COR/ADILSON/PAD IN-HOUSE(OR EMERGENT/ADD-ON),NP SWAB IN TRANSPORT MEDIA 3-4 HR TAT - Swab, Nasopharynx  Once,   Status:  Canceled      06/05/20 2025 06/05/20 2023  Case Request  Once      06/05/20 2025 06/05/20 2015  sodium chloride 0.9 % infusion  Continuous,   Status:  Discontinued      06/05/20 1924 06/05/20 2015  HYDROmorphone (DILAUDID) injection 0.5 mg  Once      06/05/20 1924 06/05/20 2000  Vital Signs  Every 4 Hours,   Status:  Canceled      06/05/20 1935 06/05/20 2000  ketorolac (TORADOL) injection 30 mg  Every 6 Hours PRN,   Status:  Discontinued      06/05/20 1935 06/05/20 1936  POC Glucose Q6H  Every 6 Hours,   Status:  Canceled      06/05/20 1935 06/05/20 1932  MRSA Screen, PCR (Inpatient) - Swab, Nares  Once      06/05/20 1935 06/05/20 1931  Pharmacy to dose vancomycin  Continuous PRN,   Status:  Discontinued      06/05/20 1935 06/05/20 1930  Inpatient ENT Consult  Once,   Status:  Canceled     Specialty:  Otolaryngology  Provider:  Phillip Parra Jr., MD    06/05/20 1935 06/05/20 1929  acetaminophen (TYLENOL) tablet 650 mg  Every 4 Hours PRN,   Status:  Discontinued      06/05/20 1935 06/05/20 1929  acetaminophen (TYLENOL) 160 MG/5ML solution 650 mg  Every 4 Hours PRN,   Status:  Discontinued      06/05/20 1935 06/05/20 1929  acetaminophen (TYLENOL) suppository 650 mg  Every 4 Hours PRN,   Status:  Discontinued      06/05/20 1935 06/05/20 1929  Code Status and Medical Interventions:  Continuous,   Status:  Canceled      06/05/20 1935 06/05/20 1929  Intake & Output  Every Shift,   Status:  Canceled      06/05/20 1935 06/05/20 1929  Weigh Patient  Once,   Status:  Canceled      06/05/20 1935 06/05/20 1929  Do NOT Hold Basal or Correction Scale Insulin When Patient is NPO, Hold Scheduled Mealtime (Bolus) Insulin if NPO  Continuous,   Status:  Canceled      06/05/20 1935 06/05/20 1929  Follow [facility] Hypoglycemia Standing Orders For Blood  Glucose Less Than 70 mg/dL  Until Discontinued,   Status:  Canceled     Comments:  ALERT PATIENT - NOT NPO & CAN SAFELY SWALLOW  Administer 4 oz Fruit Juice OR 4 oz Regular Soda OR 8 oz Milk OR 15-30 grams (1 tube) of Glucose Gel.  Recheck Blood Glucose Approximately 15 Minutes After Ingestion, Repeat Treatment & Continue to Recheck Blood Sugar Approximately Every 15 Minutes Until Blood Glucose is 70 or Higher.  Once Blood Glucose is 70 or Higher & if It Will Be More Than 60 Minutes Until Next Meal, Provide Appropriate Snack (Including Carbohydrate Food) Based on Meal Plan Order. Give Meal Tray As Soon As Possible.    PATIENT HAS IV ACCESS - UNRESPONSIVE, NPO OR UNABLE TO SAFELY SWALLOW  Administer 25g (50ml) D50W IV Push.  Recheck Blood Glucose Approximately 15 Minutes After Administration, if Blood Glucose Remains Less Than 70, Repeat Treatment   Recheck Blood Glucose Approximately 15 Minutes After 2nd Administration, if Blood Glucose Remains Less Than 70 After 2nd Dose of D50W, Contact Provider for Further Treatment Orders & Consider Adding IVF With D5W for Maintenance    PATIENT WITHOUT IV ACCESS - UNRESPONSIVE, NPO OR UNABLE TO SAFELY SWALLOW  Administer 1mg Glucagon SQ & Establish IV Access.  Turn Patient on Side - Nausea / Vomiting May Occur.  Recheck Blood Glucose Approximately 15 Minutes After Administration.  If Blood Glucose Remains Less Than 70, Administer 25g D50W IV Push (50ml).  Recheck Blood Glucose Approximately 15 Minutes After Administration of D50W, if Blood Glucose Remains Less Than 70, Contact Provider for Further Treatment Orders & Consider Adding IVF With D5 for Maintenance    Document Event & Patient Response to Interventions in EMR, Document Medications on MAR  Notify Provider if Hypoglycemia Treatment Needed    06/05/20 1935 06/05/20 1929  Oxygen Therapy- Nasal Cannula; Titrate for SPO2: 90% - 95%  Continuous,   Status:  Canceled      06/05/20 1935 06/05/20 1929  Insert Peripheral IV   Once,   Status:  Canceled      06/05/20 1935 06/05/20 1929  Saline Lock & Maintain IV Access  Continuous,   Status:  Canceled      06/05/20 1935 06/05/20 1929  Place Sequential Compression Device  Once      06/05/20 1935 06/05/20 1929  Maintain Sequential Compression Device  Continuous,   Status:  Canceled      06/05/20 1935 06/05/20 1929  Activity - Ad Carmenza  Until Discontinued,   Status:  Canceled      06/05/20 1935 06/05/20 1929  NPO Diet  Diet Effective Now,   Status:  Canceled      06/05/20 1935 06/05/20 1928  dextrose (GLUTOSE) oral gel 15 g  Every 15 Minutes PRN,   Status:  Discontinued      06/05/20 1935 06/05/20 1928  dextrose (D50W) 25 g/ 50mL Intravenous Solution 25 g  Every 15 Minutes PRN,   Status:  Discontinued      06/05/20 1935 06/05/20 1928  glucagon (human recombinant) (GLUCAGEN DIAGNOSTIC) injection 1 mg  Every 15 Minutes PRN,   Status:  Discontinued      06/05/20 1935 06/05/20 1928  sodium chloride 0.9 % flush 10 mL  As Needed,   Status:  Discontinued      06/05/20 1935    --  ibuprofen (ADVIL,MOTRIN) 200 MG tablet  Every 6 Hours PRN      06/05/20 1926    Signed and Held  Follow Anesthesia Guidelines / Standing Orders  Once,   Status:  Canceled      Signed and Held    Signed and Held  NPO Diet  Diet Effective Now,   Status:  Canceled      Signed and Held    Signed and Held  Obtain Informed Consent  Once,   Status:  Canceled      Signed and Held    Signed and Held  Vital Signs - Per Anesthesia Protocol  As Needed,   Status:  Canceled      Signed and Held    Signed and Held  Oxygen Therapy- Nasal Cannula; Titrate for SPO2: equal to or greater than, 90%  Continuous,   Status:  Canceled      Signed and Held    Signed and Held  Pulse Oximetry, Continuous  Continuous,   Status:  Canceled      Signed and Held    Signed and Held  Insert Peripheral IV  Once,   Status:  Canceled      Signed and Held    Signed and Held  Saline Lock & Maintain IV Access  Continuous,   Status:   Canceled      Signed and Held    Signed and Held  sodium chloride 0.9 % flush 3 mL  Every 12 Hours Scheduled,   Status:  Canceled      Signed and Held    Signed and Held  sodium chloride 0.9 % flush 3-10 mL  As Needed,   Status:  Canceled      Signed and Held    Signed and Held  lidocaine PF 1% (XYLOCAINE) injection 0.5 mL  Once As Needed,   Status:  Canceled      Signed and Held    Signed and Held  lactated ringers infusion  Continuous,   Status:  Canceled      Signed and Held    Signed and Held  acetaminophen (TYLENOL) tablet 1,000 mg  Once,   Status:  Canceled      Signed and Held    Signed and Held  midazolam (VERSED) injection 1 mg  Every 5 Minutes PRN,   Status:  Canceled      Signed and Held    Signed and Held  midazolam (VERSED) injection 2 mg  Every 5 Minutes PRN,   Status:  Canceled      Signed and Held                   Discharge Summary      Sneha Carter APRN at 06/07/20 0924     Attestation signed by Laci Teran MD at 06/07/20 1148    I personally evaluated and examined the patient in conjunction with SELENA Dewey and agree with the assessment, treatment plan, and disposition of the patient as recorded by her. My history, exam, and further recommendations are:     S:  Doing ok.  Afebrile    O:  CVS RRR    A:  facial cellulitis/abscess     P:  ok for d/c on PO abx      Laci Teran MD  06/07/20  11:47                        [facility] Medicine Services  DISCHARGE SUMMARY     Date of Admission: 6/5/2020  Date of Discharge:  6/7/2020  Primary Care Physician: Provider, No Known    Presenting Problem/History of Present Illness:  Facial abscess [L02.01]     Discharge Diagnoses:  Active Hospital Problems    Diagnosis   • Facial abscess   • Cellulitis of face   • Uncontrolled type 2 diabetes mellitus with hyperglycemia (CMS/HCC)   • Facial pain, acute   • Hyponatremia     Chief Complaint on Day of Discharge: Feels better.  Upper lip less  swollen.  History of Present Illness on Day of Discharge:   Sitting up in bed.  He feels better today.  Upper lip much less swollen today.  He denies nausea, vomiting abdominal pain.  Denies chest pain, palpitations, shortness of breath.  Discussed insulin therapy with patient and he is agreeable.  He has taking insulin in the past.    Consults: Dr. Parra, ENT    Procedures Performed:   Incision and drainage upper lip abscess 6/5/2020, Dr. Parra    Pertinent Test Results:   Laboratory Data:    Results from last 7 days   Lab Units 06/07/20  0325 06/06/20  0311 06/05/20  1242   WBC 10*3/mm3 7.77 12.59* 12.5*   HEMOGLOBIN g/dL 13.6 13.5 16.1   HEMATOCRIT % 39.2 38.6 44.7   PLATELETS 10*3/mm3 225 197 200        Results from last 7 days   Lab Units 06/07/20  0325 06/05/20  1230   SODIUM mmol/L 136 133*   POTASSIUM mmol/L 4.0  --    CHLORIDE mmol/L 98 96*   TOTAL CO2 mmol/L  --  24   CO2 mmol/L 26.0  --    BUN mg/dL 16 18   CREATININE mg/dL 0.65* 0.7   CALCIUM mg/dL 8.8 9.0   BILIRUBIN mg/dL  --  0.7   ALK PHOS U/L  --  105   ALT (SGPT) U/L  --  16   AST (SGOT) U/L  --  14   GLUCOSE mg/dL 298* 269*     Lab Results (all)     Procedure Component Value Units Date/Time    Wound Culture - Wound, Nose [[id number]]  (Abnormal)  (Susceptibility) Collected:  06/05/20 2213    Specimen:  Wound from Nose Updated:  06/07/20 0727     Wound Culture Light growth (2+) Staphylococcus aureus     Gram Stain Rare (1+) WBCs seen      Rare (1+) Gram positive cocci    Susceptibility      Staphylococcus aureus     ANITA     Tetracycline Susceptible     Trimethoprim + Sulfamethoxazole Susceptible                    Body Fluid Culture - Body Fluid, Lip, Upper [[id number]]  (Abnormal)  (Susceptibility) Collected:  06/05/20 2209    Specimen:  Body Fluid from Lip, Upper Updated:  06/07/20 0727     Body Fluid Culture Light growth (2+) Staphylococcus aureus     Gram Stain Many (4+) WBCs seen      Moderate (3+) Gram positive cocci    Susceptibility       Staphylococcus aureus     ANITA     Gentamicin Susceptible     Oxacillin Susceptible     Rifampin Susceptible     Vancomycin Susceptible                Susceptibility Comments     Staphylococcus aureus    This isolate does not demonstrate inducible clindamycin resistance in vitro.    This isolate does not demonstrate inducible clindamycin resistance in vitro.               Basic Metabolic Panel [[id number]]  (Abnormal) Collected:  06/07/20 0325    Specimen:  Blood Updated:  06/07/20 0436     Glucose 298 mg/dL      BUN 16 mg/dL      Creatinine 0.65 mg/dL      Sodium 136 mmol/L      Potassium 4.0 mmol/L      Chloride 98 mmol/L      CO2 26.0 mmol/L      Calcium 8.8 mg/dL      eGFR Non African Amer 130 mL/min/1.73      BUN/Creatinine Ratio 24.6     Anion Gap 12.0 mmol/L     Narrative:       GFR Normal >60  Chronic Kidney Disease <60  Kidney Failure <15      CBC & Differential [[id number]] Collected:  06/07/20 0325    Specimen:  Blood Updated:  06/07/20 0419    Narrative:       The following orders were created for panel order CBC & Differential.  Procedure                               Abnormality         Status                     ---------                               -----------         ------                     CBC Auto Differential[[id number]]        Abnormal            Final result                 Please view results for these tests on the individual orders.    CBC Auto Differential [[id number]]  (Abnormal) Collected:  06/07/20 0325    Specimen:  Blood Updated:  06/07/20 0419     WBC 7.77 10*3/mm3      RBC 4.68 10*6/mm3      Hemoglobin 13.6 g/dL      Hematocrit 39.2 %      MCV 83.8 fL      MCH 29.1 pg      MCHC 34.7 g/dL      RDW 11.7 %      RDW-SD 35.6 fl      MPV 11.8 fL      Platelets 225 10*3/mm3      Neutrophil % 66.6 %      Lymphocyte % 24.3 %      Monocyte % 6.3 %      Eosinophil % 1.9 %      Basophil % 0.5 %      Immature Grans % 0.4 %      Neutrophils, Absolute 5.17 10*3/mm3      Lymphocytes, Absolute 1.89  10*3/mm3      Monocytes, Absolute 0.49 10*3/mm3      Eosinophils, Absolute 0.15 10*3/mm3      Basophils, Absolute 0.04 10*3/mm3      Immature Grans, Absolute 0.03 10*3/mm3      nRBC 0.0 /100 WBC     POC Glucose Once [[id number]]  (Abnormal) Collected:  06/06/20 1637    Specimen:  Blood Updated:  06/06/20 1700     Glucose 304 mg/dL      Comment: : [id number] [employer] ChristinaMeter ID: [id number]       POC Glucose Once [[id number]]  (Abnormal) Collected:  06/06/20 1140    Specimen:  Blood Updated:  06/06/20 1203     Glucose 205 mg/dL      Comment: : [id number] [employer] ChristinaMeter ID: [id number]       POC Glucose Once [[id number]]  (Abnormal) Collected:  06/06/20 0758    Specimen:  Blood Updated:  06/06/20 0810     Glucose 218 mg/dL      Comment: : [id number] [employer] Christina[employer]eter ID: [id number]       Lipid Panel [[id number]] Collected:  06/06/20 0311    Specimen:  Blood Updated:  06/06/20 0436     Total Cholesterol 155 mg/dL      Triglycerides 110 mg/dL      HDL Cholesterol 47 mg/dL      LDL Cholesterol  86 mg/dL      VLDL Cholesterol 22 mg/dL      LDL/HDL Ratio 1.83    Narrative:       Cholesterol Reference Ranges  (U.S. Department of Health and Human Services ATP III Classifications)    Desirable          <200 mg/dL  Borderline High    200-239 mg/dL  High Risk          >240 mg/dL      Triglyceride Reference Ranges  (U.S. Department of Health and Human Services ATP III Classifications)    Normal           <150 mg/dL  Borderline High  150-199 mg/dL  High             200-499 mg/dL  Very High        >500 mg/dL    HDL Reference Ranges  (U.S. Department of Health and Human Services ATP III Classifcations)    Low     <40 mg/dl (major risk factor for CHD)  High    >60 mg/dl ('negative' risk factor for CHD)        LDL Reference Ranges  (U.S. Department of Health and Human Services ATP III Classifcations)    Optimal          <100 mg/dL  Near Optimal     100-129 mg/dL  Borderline High  130-159 mg/dL  High              160-189 mg/dL  Very High        >189 mg/dL    CBC Auto Differential [[id number]]  (Abnormal) Collected:  06/06/20 0311    Specimen:  Blood Updated:  06/06/20 0419     WBC 12.59 10*3/mm3      RBC 4.63 10*6/mm3      Hemoglobin 13.5 g/dL      Hematocrit 38.6 %      MCV 83.4 fL      MCH 29.2 pg      MCHC 35.0 g/dL      RDW 11.8 %      RDW-SD 35.5 fl      MPV 12.2 fL      Platelets 197 10*3/mm3      Neutrophil % 75.3 %      Lymphocyte % 15.0 %      Monocyte % 8.8 %      Eosinophil % 0.2 %      Basophil % 0.2 %      Immature Grans % 0.5 %      Neutrophils, Absolute 9.47 10*3/mm3      Lymphocytes, Absolute 1.89 10*3/mm3      Monocytes, Absolute 1.11 10*3/mm3      Eosinophils, Absolute 0.03 10*3/mm3      Basophils, Absolute 0.03 10*3/mm3      Immature Grans, Absolute 0.06 10*3/mm3      nRBC 0.0 /100 WBC     MRSA Screen, PCR (Inpatient) - Swab, Nares [[id number]]  (Normal) Collected:  06/05/20 2055    Specimen:  Swab from Nares Updated:  06/05/20 2240     MRSA PCR No MRSA Detected    Anaerobic Culture - Body Fluid, Lip, Upper [[id number]] Collected:  06/05/20 2209    Specimen:  Body Fluid from Lip, Upper Updated:  06/05/20 2228    COVID-19, ABBOTT IN-HOUSE,NP Swab (NO TRANSPORT MEDIA) 2 HR TAT - Swab, Nasopharynx [[id number]]  (Normal) Collected:  06/05/20 2055    Specimen:  Swab from Nasopharynx Updated:  06/05/20 2136     COVID19 Not Detected        Culture Data:   Wound Culture   Date Value Ref Range Status   06/05/2020 Light growth (2+) Staphylococcus aureus (A)  Final      Staphylococcus aureus     ANITA     Tetracycline <=1  Susceptible     Trimethoprim + Sulfamethoxazole <=10  Susceptible      Staphylococcus aureus       ANITA     Gentamicin <=0.5  Susceptible     Oxacillin <=0.25  Susceptible     Rifampin <=0.5  Susceptible     Vancomycin 1  Susceptible      Imaging Results (Last 24 Hours)     ** No results found for the last 24 hours. **        Pertinent Data: Obtained at [facility]      Urinalysis Reflex to Culture  (06/05/2020 3:00 PM CDT)  Component Value Ref Range   Color, UA YELLOW Straw/Yellow   Clarity, UA Clear Clear   Glucose, Ur >=1000 (H) Negative mg/dL   Bilirubin Urine Negative Negative   Ketones, Urine 80 (A) Negative mg/dL   Specific Gravity, UA >1.045 1.005 - 1.030   Blood, Urine Negative Negative   pH, UA 5.5 5.0 - 8.0   Protein, UA TRACE (A) Negative mg/dL   Urobilinogen, Urine 0.2 <2.0 E.U./dL   Nitrite, Urine Negative Negative   Leukocyte Esterase, Urine Negative Negative      Urine, clean catch      CBC Auto Differential (06/05/2020 12:42 PM CDT)  Component Value Ref Range   WBC 12.5 (H) 4.8 - 10.8 K/uL   RBC 5.38 4.70 - 6.10 M/uL   Hemoglobin 16.1 14.0 - 18.0 g/dL   Hematocrit 44.7 42.0 - 52.0 %   MCV 83.1 80.0 - 94.0 fL   MCH 29.9 27.0 - 31.0 pg   MCHC 36.0 33.0 - 37.0 g/dL   RDW 11.9 11.5 - 14.5 %   Platelets 200 130 - 400 K/uL   MPV 11.9 9.4 - 12.4 fL   Neutrophils % 76.6 (H) 50.0 - 65.0 %   Lymphocytes % 14.7 (L) 20.0 - 40.0 %   Monocytes % 7.4 0.0 - 10.0 %   Eosinophils % 0.4 0.0 - 5.0 %   Basophils % 0.4 0.0 - 1.0 %   Neutrophils Absolute 9.6 (H) 1.5 - 7.5 K/uL   Immature Granulocytes # 0.1 K/uL   Lymphocytes Absolute 1.8 1.1 - 4.5 K/uL   Monocytes Absolute 0.90 0.00 - 0.90 K/uL   Eosinophils Absolute 0.10 0.00 - 0.60 K/uL   Basophils Absolute 0.10 0.00 - 0.20 K/uL      Component Value Ref Range   Acetone, Bld Negative Negative      Hemoglobin A1C    14.4 (H)    4.0 - 6.0 %      Lactic Acid 1.1 0.5 - 1.9 mmol/L      CRP 19.23 (H) 0.00 - 0.50 mg/dL      Component Value Ref Range   Sodium 133 (L) 136 - 145 mmol/L   Potassium reflex Magnesium 4.2 3.5 - 5.0 mmol/L   Chloride 96 (L) 98 - 111 mmol/L   CO2 24 22 - 29 mmol/L   Anion Gap 13 7 - 19 mmol/L   Glucose 269 (H) 74 - 109 mg/dL   BUN 18 6 - 20 mg/dL   CREATININE 0.7 0.5 - 1.2 mg/dL   GFR Non-African American >60 >60   Calcium 9.0 8.6 - 10.0 mg/dL   Total Protein 7.3 6.6 - 8.7 g/dL   Alb 3.9 3.5 - 5.2 g/dL   Total Bilirubin 0.7 0.2 - 1.2 mg/dL      Alkaline Phosphatase 105 40 - 130 U/L   ALT 16 5 - 41 U/L   AST 14 5 - 40 U/L      Imaging Results     CT Soft Tissue Neck W Contrast (06/05/2020 1:56 PM CDT)  Impressions   1. Upper lip cellulitis with midline (and slightly left of midline) multilocular abscess.    Signed by Dr Yuri Husain on 6/5/2020 2:05 PM        Narrative   CT SOFT TISSUE NECK W CONTRAST     6/5/2020 2:00 PM    History: Pain and swelling to the nose and upper lip. Facial infection.    Upper left swelling and skin thickening represents cellulitis changes.    There is a multiloculated abscess collection within the upper lip extending to the base of the nose.    This collection has an aggregate measurement of approximately 20 x 16 x 15 mm.     Nasal septum is uninvolved.    Paranasal sinuses are clear.    Visualized portion of the brain is normal.    Normal nasopharynx and parapharyngeal spaces.    Bilateral submandibular lymph nodes are mildly prominent measuring up o 16 x 10 mm.        XR CHEST PORTABLE (06/05/2020 11:52 AM CDT)  Narrative   EXAMINATION:  XR CHEST PORTABLE  6/5/2020 11:54 AM    HISTORY: Facial infection and pain.    COMPARISON: No comparison study.    FINDINGS:  The lungs are expanded and clear. The heart size is normal.    No acute bony abnormality is seen.        HISTORY: Facial infection and pain.  COMPARISON: No comparison study.  FINDINGS: The lungs are expanded and clear. The heart size is normal.  No acute bony abnormality is seen.    IMPRESSION:  Portable chest x-ray demonstrates no active disease.    Signed by Dr Ronen Guerrero on 6/5/2020 11:55 AM       Hospital Course  Patient is a 49 y.o. male transferred to [facility] from [facility] for facial pain with facial abscess requiring ENT evaluation.  He has a past medical history significant for untreated diabetes mellitus.  Patient reports blood sugars were elevated in the past due to stress and infection.  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  Patient recently moved to this region from [facility].  Patient was grooming himself 3 days earlier and remove nasal hairs and developed infection.  He presented to [facility] emergency room with facial abscess.  Patient was complaining of pain level 10 out of 10.  Lab work revealed hemoglobin A1c 14.1, mild hyponatremia, leukocytosis with left shift.  Was transferred to our facility for ENT surgical intervention due to lack of coverage for ENT surgeon at outside facility.    He was admitted to the medical floor with facial abscess and cellulitis and elevated blood sugars with hyperglycemia.  He was made nothing by mouth, SCDs for deep vein thrombosis prophylaxis.  IV fluids started as patient was n.p.o.  ENT consulted and he was seen by  with impression acute upper lip and nasal abscess.  Incision and drainage discussed with patient and he was agreeable.  On 6/5 Dr. Parra performed incision and drainage of upper lip abscess.  Patient had packing through the nasal drainage site.  Vancomycin and Zosyn started on admission.  Patient was transitioned to IV doxycycline.  Nasal packing removed on 6/6/2020.  Dr. Parra felt as though patient need an additional 24 hours of IV antibiotics prior to discharge.  Again, antibiotics changed to doxycycline IV.  Wound culture light growth staph aureus and wound culture upper lip light growth staph aureus.  Patient was transitioned to oral doxycycline at discharge.  He will follow-up with Dr. Parra as needed.  On 6/7 upper lip edema much improved.  Erythema essentially resolved.  Patient will continue local wound care per ENT recommendations.    Hemoglobin A1c 14.1.  Patient reported he had taken insulin in the past and was transitioned to metformin and then discontinued.  He reported that blood sugars were elevated due to traumatic injury.  Initially, he was reluctant to start insulin.  However, after further  "discussing hemoglobin A1c of 14.1 patient was agreeable to initiating insulin therapy nightly.  Again, he has taken insulin in the past and is familiar with insulin injection.  Glucose monitoring glucometer provided to patient at discharge.  He was started on Levemir 10 units nightly.  He has been advised to monitor blood sugars 3-4 times daily.    Patient recently relocated to this region from [facility] and has no primary care provider.  We will arrange follow-up with Dr. Manning/Nathanael/Maday in 1 week to establish care as new patient and further adjust insulin as needed.    On 6/7/2020 he is stable for discharge.  He has been evaluated by Dr. Parra today and local wound care instructions have been provided by ENT service.  Left upper lip edema much improved.  Erythema essentially resolved.  Patient is been advised to complete antibiotic treatment with doxycycline after discharge.  He has been started on Levemir 10 units nightly.  He is tolerating diabetic diet without nausea, vomiting or abdominal pain.  Denies chest pain, palpitations, or shortness of breath.    Physical Exam on Discharge:  /62 (BP Location: Left arm, Patient Position: Sitting)   Pulse 75   Temp 97.9 °F (36.6 °C)   Resp 16   Ht 170.2 cm (67\")   Wt 83.9 kg (185 lb)   SpO2 98%   BMI 28.98 kg/m²    Physical Exam   Constitutional: He is oriented to person, place, and time.   No acute distress.  Sitting up in chair.  No oxygen use.  No family in room.   HENT:   Upper lip swelling improved almost resolved.  Left facial swelling resolved, erythema resolved left facial region   Eyes: Pupils are equal, round, and reactive to light. EOM are normal.   Neck: Normal range of motion. Neck supple.   Cardiovascular: Normal rate, regular rhythm, normal heart sounds and intact distal pulses. Exam reveals no gallop and no friction rub.   No murmur heard.  Pulmonary/Chest: Effort normal and breath sounds normal. He has no wheezes. He has no " rales.   Abdominal: Soft. Bowel sounds are normal. He exhibits no distension. There is no tenderness.   Genitourinary:   Genitourinary Comments: Voiding.   Musculoskeletal: He exhibits edema (Minimal upper lip edema). He exhibits no tenderness or deformity.   Neurological: He is alert and oriented to person, place, and time.   Skin: No rash noted. No pallor.   Erythema essentially resolved   Psychiatric: His behavior is normal. Judgment and thought content normal.     Condition on Discharge: Stable    Discharge Disposition: Home    Discharge Diet:   Diet Instructions     Diet: Consistent Carbohydrate      Discharge Diet:  Consistent Carbohydrate        Activity at Discharge:   Activity Instructions     Activity as Tolerated          Discharge Care Plan / Instructions:   1.  For worsening facial swelling or edema seek medical attention.  2.  Doxycycline 100 mg twice daily.  3.  Follow-up with Dr. Parra as needed.  4.  Levemir 10 units nightly.  5.  Follow-up with Dr. Manning/Nathanael/Maday 1 week to establish care as new patient.  6.  Wound care discharge instructions given per Dr. Parra    Discharge Medications:     Discharge Medications      New Medications      Instructions Start Date   doxycycline 100 MG tablet  Commonly known as:  ADOXA   100 mg, Oral, Every 12 Hours Scheduled      insulin detemir 100 UNIT/ML injection  Commonly known as:  LEVEMIR   10 Units, Subcutaneous, Nightly      Pen Needles 32G X 4 MM misc   1 each, Subcutaneous, 4 Times Daily      sodium chloride 0.65 % nasal spray   2 sprays, Nasal, 5 Times Daily         Continue These Medications      Instructions Start Date   ibuprofen 200 MG tablet  Commonly known as:  ADVIL,MOTRIN   200 mg, Oral, Every 6 Hours PRN           Follow-up Appointments:   Follow-up Information     Servando Manning,  Follow up.    Specialties:  Internal Medicine, Emergency Medicine  Why:  Nigel/Amaya 1 week as new patient.  Started on insulin.  A1c  greater than 14  Contact information:  [address] [place] [address]  [facility] [address]  [phone]             Phillip Parra Jr., MD Follow up.    Specialty:  Otolaryngology  Why:  prn  Contact information:  [address] [facility]  [address]  [facility] [address]  [phone]             Provider, No Known .    Contact information:  [facility] [address]  [phone]                 Future Appointments:  No future appointments.    Test Results Pending at Discharge: None    The above documentation resulted from a face-to-face encounter by me Sneha WALTERS, [facility].    SELENA Post  06/07/20  09:44    Time: This discharge process required 35 minutes for completion.    Plan discussed with Dr. Teran, , and patient.    Time spent in face-to-face evaluation, chart review, planning and education 35 minutes.          Electronically signed by Laci Teran MD at 06/07/20 6646

## 2020-06-10 LAB
BLOOD CULTURE, ROUTINE: NORMAL
CULTURE, BLOOD 2: NORMAL

## 2020-06-11 LAB — BACTERIA SPEC ANAEROBE CULT: NORMAL

## (undated) DEVICE — PROXIMATE RH ROTATING HEAD SKIN STAPLERS (35 WIDE) CONTAINS 35 STAINLESS STEEL STAPLES: Brand: PROXIMATE

## (undated) DEVICE — YANKAUER,BULB TIP WITH VENT: Brand: ARGYLE

## (undated) DEVICE — PK TURNOVER RM ADV

## (undated) DEVICE — GLV SURG BIOGEL M LTX PF 7 1/2

## (undated) DEVICE — PREP SOL POVIDONE/IODINE BT 4OZ

## (undated) DEVICE — PK ENT HD AND NK 30